# Patient Record
Sex: FEMALE | Race: WHITE | NOT HISPANIC OR LATINO | Employment: FULL TIME | URBAN - METROPOLITAN AREA
[De-identification: names, ages, dates, MRNs, and addresses within clinical notes are randomized per-mention and may not be internally consistent; named-entity substitution may affect disease eponyms.]

---

## 2023-07-12 ENCOUNTER — APPOINTMENT (OUTPATIENT)
Dept: LAB | Facility: LAB | Age: 28
End: 2023-07-12
Payer: COMMERCIAL

## 2023-08-19 LAB — CHORIOGONADOTROPIN (MIU/ML) IN SER/PLAS: 1493 IU/L

## 2023-11-18 ENCOUNTER — HOSPITAL ENCOUNTER (EMERGENCY)
Facility: HOSPITAL | Age: 28
Discharge: HOME | End: 2023-11-18
Attending: EMERGENCY MEDICINE
Payer: COMMERCIAL

## 2023-11-18 VITALS
RESPIRATION RATE: 16 BRPM | HEIGHT: 68 IN | SYSTOLIC BLOOD PRESSURE: 144 MMHG | WEIGHT: 127 LBS | TEMPERATURE: 98.5 F | HEART RATE: 64 BPM | DIASTOLIC BLOOD PRESSURE: 89 MMHG | BODY MASS INDEX: 19.25 KG/M2 | OXYGEN SATURATION: 98 %

## 2023-11-18 DIAGNOSIS — Z57.8 EMPLOYEE EXPOSURE TO BLOOD: Primary | ICD-10-CM

## 2023-11-18 PROCEDURE — 99281 EMR DPT VST MAYX REQ PHY/QHP: CPT

## 2023-11-18 PROCEDURE — 99283 EMERGENCY DEPT VISIT LOW MDM: CPT | Performed by: EMERGENCY MEDICINE

## 2023-11-18 PROCEDURE — 99285 EMERGENCY DEPT VISIT HI MDM: CPT | Performed by: EMERGENCY MEDICINE

## 2023-11-18 SDOH — HEALTH STABILITY - PHYSICAL HEALTH: OCCUPATIONAL EXPOSURE TO OTHER RISK FACTORS: Z57.8

## 2023-11-18 ASSESSMENT — COLUMBIA-SUICIDE SEVERITY RATING SCALE - C-SSRS
1. IN THE PAST MONTH, HAVE YOU WISHED YOU WERE DEAD OR WISHED YOU COULD GO TO SLEEP AND NOT WAKE UP?: NO
6. HAVE YOU EVER DONE ANYTHING, STARTED TO DO ANYTHING, OR PREPARED TO DO ANYTHING TO END YOUR LIFE?: NO
2. HAVE YOU ACTUALLY HAD ANY THOUGHTS OF KILLING YOURSELF?: NO

## 2023-11-18 NOTE — ED PROVIDER NOTES
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  Patient was obtaining an arterial blood gas when she was splashed in the face with blood.  Patient reports that she rinsed eyes at eyewash station for greater than 5 minutes.  Patient reports that she is unaware of any communicable diseases that the patient may have.    I looked up information for source patient, patient is currently admitted as a trauma gyn under alias no HIV test available under alias, I did look up the patient's true identity, did review laboratory testing noted that the patient had nonreactive HIV testing in 2019.    Records Reviewed:  Recent available ED and inpatient notes reviewed in EMR.    PMHx/PSHx:  Per HPI.   - has a past medical history of Encounter for surveillance of implantable subdermal contraceptive (05/14/2021), Other conditions influencing health status (05/18/2018), Other conditions influencing health status, Pain in unspecified shoulder (12/18/2017), and Personal history of other specified conditions (03/13/2018).  - has a past surgical history that includes Shoulder surgery (04/04/2017).    Medications:  Reviewed in EMR. See EMR for complete list of medications and doses.    Allergies:  Patient has no known allergies.    Social History:  - Tobacco:  has no history on file for tobacco use.   - Alcohol:  has no history on file for alcohol use.   - Illicit Drugs:  has no history on file for drug use.     ROS:  Per HPI.       ???????????????????????????????????????????????????????????????  Triage Vitals:  T 36.9 °C (98.5 °F)  HR 64  /89  RR 16  O2 98 % None (Room air)    Physical Exam  Vitals and nursing note reviewed.   Constitutional:       Appearance: Normal appearance.   HENT:      Head: Normocephalic and atraumatic.      Nose: Nose normal.      Mouth/Throat:      Pharynx:  Oropharynx is clear.   Eyes:      Extraocular Movements: Extraocular movements intact.      Pupils: Pupils are equal, round, and reactive to light.   Cardiovascular:      Rate and Rhythm: Normal rate and regular rhythm.      Pulses: Normal pulses.   Pulmonary:      Effort: Pulmonary effort is normal.      Breath sounds: Normal breath sounds.   Musculoskeletal:         General: No swelling or deformity. Normal range of motion.      Cervical back: Normal range of motion.      Right lower leg: No edema.      Left lower leg: No edema.   Skin:     General: Skin is warm and dry.      Capillary Refill: Capillary refill takes less than 2 seconds.   Neurological:      General: No focal deficit present.      Mental Status: She is alert and oriented to person, place, and time.   Psychiatric:         Mood and Affect: Mood normal.         Behavior: Behavior normal.       ???????????????????????????????????????????????????????????????    Assessment and Plan:  I had extensive discussion with the patient regarding the risks and benefits of postexposure prophylaxis for HIV.  Patient is declining initiation of postexposure prophylaxis at this time and is agreeable to following up with employee health on Monday morning.  Patient completed all appropriate paperwork, patient was provided with a laboratory requisition for source patient and was instructed to ensure that charge nurse sends ordered blood work including HIV antigen/antibody, hepatitis B surface antigen, hepatitis C antibody.  Patient is agreeable this plan agreeable discharge and is discharged from the emergency department stable condition.  All necessary post exposure paperwork was faxed to employee health and the patient is agreeable to employee health follow-up.  ED Course:   As noted above    Social Determinants Limiting Care:      Disposition:  Discharge with employee health follow-up.    Vince Otero MD       Procedures ? [employer] last updated 11/18/2023 6:03 PM         Vince Otero MD  Resident  11/18/23 4542

## 2023-11-18 NOTE — ED TRIAGE NOTES
Pt is employee was getting ABG when blood had squirted all over face and into eyes. Pt states rinsed eyes with water and CHG.

## 2023-11-18 NOTE — DISCHARGE INSTRUCTIONS
You have 72 hours to start postexposure prophylaxis.  You must follow-up with employee health first thing on Monday morning.    If you have any questions you can call this office during business hours at : [phone]    Additionally please ensure that blood work is sent for the patient that you were exposed to.  You have been provided with laboratory requisition form for this exposure.

## 2024-01-02 ENCOUNTER — HOSPITAL ENCOUNTER (EMERGENCY)
Facility: HOSPITAL | Age: 29
Discharge: HOME | End: 2024-01-02
Payer: COMMERCIAL

## 2024-01-02 VITALS
TEMPERATURE: 96.9 F | WEIGHT: 130 LBS | HEIGHT: 68 IN | HEART RATE: 71 BPM | OXYGEN SATURATION: 99 % | BODY MASS INDEX: 19.7 KG/M2 | RESPIRATION RATE: 16 BRPM

## 2024-01-02 DIAGNOSIS — Z77.21 EXPOSURE TO BODY FLUID: Primary | ICD-10-CM

## 2024-01-02 PROCEDURE — 99281 EMR DPT VST MAYX REQ PHY/QHP: CPT

## 2024-01-02 PROCEDURE — 99283 EMERGENCY DEPT VISIT LOW MDM: CPT | Performed by: PHYSICIAN ASSISTANT

## 2024-01-03 NOTE — ED TRIAGE NOTES
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 She was wearing her glasses and she had her mouth closed. Pt used alcohol and CHG to her face and cleaned the area thoroughly.

## 2024-01-03 NOTE — ED PROVIDER NOTES
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  She was wearing her glasses, no blood got in her eye.  Patient is \"99.8%\" certain that nothing got on her lips or in her mouth.  Patient states that her charge nurse told her that the safer thing is to come to the ED just in case.  The source patient in this instance is HIV positive, patient unsure of her viral load.                          Adan Coma Scale Score: 15                  Patient History   Past Medical History:   Diagnosis Date    Encounter for surveillance of implantable subdermal contraceptive 05/14/2021    Encounter for removal and reinsertion of Nexplanon    Other conditions influencing health status 05/18/2018    Cyst of neck    Other conditions influencing health status     Nulliparity    Pain in unspecified shoulder 12/18/2017    Pain in shoulder    Personal history of other specified conditions 03/13/2018    History of abnormal weight loss     Past Surgical History:   Procedure Laterality Date    SHOULDER SURGERY  04/04/2017    Shoulder Surgery     No family history on file.  Social History     Tobacco Use    Smoking status: Not on file    Smokeless tobacco: Not on file   Substance Use Topics    Alcohol use: Not on file    Drug use: Not on file       Physical Exam   ED Triage Vitals [01/02/24 2125]   Temp Heart Rate Resp BP   36.1 °C (96.9 °F) 71 16 --      SpO2 Temp Source Heart Rate Source Patient Position   99 % Temporal -- --      BP Location FiO2 (%)     -- --       Physical Exam  Vitals and nursing note reviewed.   Constitutional:       General: She is not in acute distress.     " Appearance: Normal appearance. She is not toxic-appearing.   HENT:      Head: Normocephalic and atraumatic.      Nose: Nose normal.   Eyes:      Extraocular Movements: Extraocular movements intact.   Cardiovascular:      Rate and Rhythm: Normal rate and regular rhythm.   Pulmonary:      Effort: Pulmonary effort is normal.   Abdominal:      Palpations: Abdomen is soft.   Musculoskeletal:         General: Normal range of motion.      Cervical back: Normal range of motion and neck supple.   Skin:     General: Skin is warm and dry.   Neurological:      General: No focal deficit present.      Mental Status: She is alert.   Psychiatric:         Mood and Affect: Mood normal.         Thought Content: Thought content normal.         ED Course & MDM        Medical Decision Making      MDM: Patient is a 28-year-old female who presents today after being splashed with blood while trying to unhook the J-tube on an IV on the patient.  Patient (source) is known HIV positive, unknown viral load.  This patient is very confident that there was no mucous membrane exposure.  I discussed with patient that the risk of spread even with the mucous membrane exposure is relatively low however if it only touched the skin, the risk is essentially nonexistent.  With this being the case,, patient would like to decline postexposure prophylaxis, we did order lab requisition for the source patient to have hepatitis B, C, HIV drawn for [email or web address] protocol.  Patient is aware to follow-up with [email or web address] tomorrow for further evaluation and management.        Procedure  Procedures     [place] PA-C  01/02/24 [id number]

## 2024-02-06 ENCOUNTER — OFFICE VISIT (OUTPATIENT)
Dept: OBSTETRICS AND GYNECOLOGY | Facility: CLINIC | Age: 29
End: 2024-02-06

## 2024-02-06 VITALS
WEIGHT: 134 LBS | HEIGHT: 68 IN | SYSTOLIC BLOOD PRESSURE: 120 MMHG | BODY MASS INDEX: 20.31 KG/M2 | DIASTOLIC BLOOD PRESSURE: 70 MMHG

## 2024-02-06 DIAGNOSIS — Z01.419 ENCOUNTER FOR WELL WOMAN EXAM WITH ROUTINE GYNECOLOGICAL EXAM: Primary | ICD-10-CM

## 2024-02-06 DIAGNOSIS — Z3A.01 LESS THAN 8 WEEKS GESTATION OF PREGNANCY (HHS-HCC): ICD-10-CM

## 2024-02-06 DIAGNOSIS — Z11.3 SCREEN FOR STD (SEXUALLY TRANSMITTED DISEASE): ICD-10-CM

## 2024-02-06 LAB — PREGNANCY TEST URINE, POC: POSITIVE

## 2024-02-06 PROCEDURE — 81025 URINE PREGNANCY TEST: CPT | Performed by: OBSTETRICS & GYNECOLOGY

## 2024-02-06 PROCEDURE — 1036F TOBACCO NON-USER: CPT | Performed by: OBSTETRICS & GYNECOLOGY

## 2024-02-06 PROCEDURE — 87800 DETECT AGNT MULT DNA DIREC: CPT

## 2024-02-06 PROCEDURE — 87591 N.GONORRHOEAE DNA AMP PROB: CPT

## 2024-02-06 PROCEDURE — 99395 PREV VISIT EST AGE 18-39: CPT | Performed by: OBSTETRICS & GYNECOLOGY

## 2024-02-06 PROCEDURE — 87491 CHLMYD TRACH DNA AMP PROBE: CPT

## 2024-02-06 RX ORDER — FOLIC ACID 0.8 MG
TABLET ORAL DAILY
COMMUNITY

## 2024-02-06 RX ORDER — ESCITALOPRAM OXALATE 10 MG/1
10 TABLET ORAL
COMMUNITY
Start: 2024-01-03 | End: 2024-04-02

## 2024-02-06 NOTE — PROGRESS NOTES
PAP 4-12-21 NEG, HPV NEG   STD SCREEN NEVER  GARDASIL NEVER    CHAPERONE: DECLINED    ASSESSMENT/PLAN    Encounter for well woman exam with routine gynecological  Normal well woman exam.  No pap done.   Last pap 2021 was normal and HPV negative.    Early pregnancy  Ultrasound confirms gestational sac size consistent with LMP dating of 5 weeks.   RTO for new OB visit 3 weeks.    Screen for STD (sexually transmitted disease)  C. Trachomatis / N. Gonorrhoeae, Amplified Detection     SUBJECTIVE    HPI    28-year-old G0 presents today for her annual exam.  Also notes she had a positive pregnancy test yesterday.   Sure LMP 1/2/2024 5 wks.   Last visit 9/2023 followup s/p EAB.  This is a planned pregnancy and pt happy about it.   Known h/o left breast fibroadenoma. Saw Nicolle Mcnulty at the breast center, last visit 6/2023. 6/2023 Ultrasound unchanged  We discussed the benign nature of the breast fibroadenomas.  Denies any other intervening medical or surgical issues.   Last year came off of Paxil. Was off for several months and doing okay. 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 Increased anxiety sx so one month ago started on lexapro 10mg. Doing very well on Lexapro.  No smoke, occ ETOH.       Review of Systems    Constitutional: no fever, no chills, no recent weight gain, no recent weight loss and no fatigue.   Eyes: no eye pain, no vision problems and no dryness of the eyes.   ENT: no hearing loss, no nosebleeds and no sinus congestion.   Cardiovascular: no chest pain, no palpitations and no orthopnea.   Respiratory: no shortness of breath, no cough and no wheezing.   Gastrointestinal: no abdominal pain, no constipation, no nausea, no diarrhea and no vomiting.   Genitourinary: no pelvic pain, no dysuria, no urinary incontinence, no vaginal dryness, no vaginal itching, no dyspareunia, no dysmenorrhea, no sexual problems, no change in urinary frequency, no vaginal discharge, no unexplained vaginal  "bleeding and no lesion/sore.   Musculoskeletal: no back pain, no joint swelling and no leg edema.   Integumentary: no rashes, no skin lesions, no breast pain, no nipple discharge and no breast lump.   Neurological: no headache, no numbness and no dizziness.   Psychiatric: no sleep disturbances, no anxiety and no depression.   Endocrine: no hot flashes, no loss of hair and no hirsutism.   Hematologic/Lymphatic: no swollen glands, no tendency for easy bleeding and no tendency for easy bruising.      OBJECTIVE    /70   Ht 1.734 m (5' 8.25\")   Wt 60.8 kg (134 lb)   LMP 01/02/2024   BMI 20.23 kg/m²      Physical Exam     Constitutional: Alert and in no acute distress. Well developed, well nourished   Head and Face: Head and face: normal   Eyes: Normal external exam - nonicteric sclera, extraocular movements intact (EOMI) and no ptosis.   Ears, Nose, Mouth, and Throat: External inspection of ears and nose: normal   Neck: no neck asymmetry. Supple and thyroid not enlarged and there were no palpable thyroid nodules   Cardiovascular: Heart rate and rhythm were normal, normal S1 and S2, no gallops, and no murmurs   Pulmonary: No respiratory distress and clear bilateral breath sounds   Chest: Breasts: normal appearance, no nipple discharge and no skin changes and palpation of breasts and axillae: no palpable mass and no axillary lymphadenopathy Left breast lump 1 x 1cm, 4 cm from nipple. Right breast 1x.8cm adjacent to areola.    Abdomen: soft nontender; no abdominal mass palpated, no organomegaly and no hernias   Genitourinary: external genitalia: normal, no inguinal lymphadenopathy, Bartholin's urethral and [place]'s glands: normal, urethra: normal, bladder: normal on palpation and perianal area: normal   Vagina: normal.   Cervix: Normal appearing without lesions.  Uterus: Normal, mobile, nontender.  Right Adnexa/parametria: Normal.   Left Adnexa/parametria: Normal.   Skin: normal skin color and pigmentation, normal " skin turgor, and no rash.   Psychiatric: alert and oriented x 3., affect normal to patient baseline and mood: appropriate     Informal IO transvaginal ultrasound shows intrauterine gestational sac .31 cm 4wk5d.         Jessica Butler MD

## 2024-02-07 LAB
C TRACH RRNA SPEC QL NAA+PROBE: NEGATIVE
N GONORRHOEA DNA SPEC QL PROBE+SIG AMP: NEGATIVE

## 2024-02-29 ENCOUNTER — OFFICE VISIT (OUTPATIENT)
Dept: OBSTETRICS AND GYNECOLOGY | Facility: CLINIC | Age: 29
End: 2024-02-29
Payer: COMMERCIAL

## 2024-02-29 ENCOUNTER — LAB (OUTPATIENT)
Dept: LAB | Facility: LAB | Age: 29
End: 2024-02-29
Payer: COMMERCIAL

## 2024-02-29 VITALS
SYSTOLIC BLOOD PRESSURE: 120 MMHG | DIASTOLIC BLOOD PRESSURE: 70 MMHG | BODY MASS INDEX: 20.31 KG/M2 | HEIGHT: 68 IN | WEIGHT: 134 LBS

## 2024-02-29 DIAGNOSIS — Z32.00 VISIT FOR CONFIRMATION OF PREGNANCY TEST RESULT WITH PHYSICAL EXAM: ICD-10-CM

## 2024-02-29 DIAGNOSIS — O21.9 NAUSEA AND VOMITING OF PREGNANCY, ANTEPARTUM (HHS-HCC): ICD-10-CM

## 2024-02-29 DIAGNOSIS — Z32.00 VISIT FOR CONFIRMATION OF PREGNANCY TEST RESULT WITH PHYSICAL EXAM: Primary | ICD-10-CM

## 2024-02-29 LAB
ABO GROUP (TYPE) IN BLOOD: NORMAL
ANTIBODY SCREEN: NORMAL
CRL: 16.4 MM
ERYTHROCYTE [DISTWIDTH] IN BLOOD BY AUTOMATED COUNT: 12.1 % (ref 11.5–14.5)
HBV SURFACE AG SERPL QL IA: NONREACTIVE
HCT VFR BLD AUTO: 39 % (ref 36–46)
HCV AB SER QL: NONREACTIVE
HGB BLD-MCNC: 12.7 G/DL (ref 12–16)
HIV 1+2 AB+HIV1 P24 AG SERPL QL IA: NONREACTIVE
MCH RBC QN AUTO: 31.1 PG (ref 26–34)
MCHC RBC AUTO-ENTMCNC: 32.6 G/DL (ref 32–36)
MCV RBC AUTO: 96 FL (ref 80–100)
NRBC BLD-RTO: 0 /100 WBCS (ref 0–0)
PLATELET # BLD AUTO: 260 X10*3/UL (ref 150–450)
RBC # BLD AUTO: 4.08 X10*6/UL (ref 4–5.2)
REFLEX ADDED, ANEMIA PANEL: NORMAL
RH FACTOR (ANTIGEN D): NORMAL
RUBV IGG SERPL IA-ACNC: 2.1 IA
RUBV IGG SERPL QL IA: POSITIVE
TREPONEMA PALLIDUM IGG+IGM AB [PRESENCE] IN SERUM OR PLASMA BY IMMUNOASSAY: NONREACTIVE
WBC # BLD AUTO: 6.3 X10*3/UL (ref 4.4–11.3)

## 2024-02-29 PROCEDURE — 85027 COMPLETE CBC AUTOMATED: CPT

## 2024-02-29 PROCEDURE — 87389 HIV-1 AG W/HIV-1&-2 AB AG IA: CPT

## 2024-02-29 PROCEDURE — 86317 IMMUNOASSAY INFECTIOUS AGENT: CPT

## 2024-02-29 PROCEDURE — 86850 RBC ANTIBODY SCREEN: CPT

## 2024-02-29 PROCEDURE — 86803 HEPATITIS C AB TEST: CPT

## 2024-02-29 PROCEDURE — 36415 COLL VENOUS BLD VENIPUNCTURE: CPT

## 2024-02-29 PROCEDURE — 87340 HEPATITIS B SURFACE AG IA: CPT

## 2024-02-29 PROCEDURE — 86901 BLOOD TYPING SEROLOGIC RH(D): CPT

## 2024-02-29 PROCEDURE — 86900 BLOOD TYPING SEROLOGIC ABO: CPT

## 2024-02-29 PROCEDURE — 1036F TOBACCO NON-USER: CPT | Performed by: OBSTETRICS & GYNECOLOGY

## 2024-02-29 PROCEDURE — 76817 TRANSVAGINAL US OBSTETRIC: CPT | Performed by: OBSTETRICS & GYNECOLOGY

## 2024-02-29 PROCEDURE — 99213 OFFICE O/P EST LOW 20 MIN: CPT | Performed by: OBSTETRICS & GYNECOLOGY

## 2024-02-29 PROCEDURE — 86780 TREPONEMA PALLIDUM: CPT

## 2024-02-29 RX ORDER — DOXYLAMINE SUCCINATE AND PYRIDOXINE HYDROCHLORIDE, DELAYED RELEASE TABLETS 10 MG/10 MG 10; 10 MG/1; MG/1
1 TABLET, DELAYED RELEASE ORAL 2 TIMES DAILY
Qty: 60 TABLET | Refills: 1 | Status: SHIPPED | OUTPATIENT
Start: 2024-02-29

## 2024-02-29 NOTE — PATIENT INSTRUCTIONS
You were seen in the office today for confirmation of pregnancy. Your baby measured 8 0/7 wks  on ultrasound today which is consistent with your last menstrual period dating.   Continue routine OB precautions at home  Continue taking prenatal vitamins. If you have not started prenatal vitamins you can start them now. Chewable and gummy prenatal vitamins are fine if it is difficult for you to swallow pills. Prenatal vitamins should have at least 800 mcg of Folic Acid to reduce the risk of neural tube/spinal defects in the baby  Routine prenatal lab work was ordered for you today and needs to be done prior to your next visit. These can be done at any  outpatient laboratory without an appointment, and do not require fasting.  Optional lab work to screen for genetic disorders can also be ordered. If these were discussed and/or ordered today the results will come separately from the rest of your routine labs, generally within 7-9 business days, and we will call you with these results. If you are still considering these labs we are happy to provide more information for review at home, and can answer any additional questions/order at the next office visit.  Avoid sick contacts and consider getting your Flu (available in office during flu season) and COVID vaccines to protect against infection in pregnancy  Make an appointment for a New OB visit in the office in the next 3-4 weeks  If you are having any concerns prior to your next visit please call the office to speak to the physician on call. This includes after hours, weekends, and holidays, when the answering service will be able to connect you with the physician on call. [phone] ([place] Office) or [phone] [place] Office.

## 2024-02-29 NOTE — PROGRESS NOTES
ASSESSMENT/PLAN    Visit for confirmation of pregnancy test result with physical exam  Today's ultrasound shows viable IUP 8 0/7 weeks which is consistent with your LMP dating.    Order OB screening labs.   - CBC Anemia Panel With Reflex,Pregnancy; Future  - Hepatitis B Surface Antigen; Future  - Hepatitis C Antibody; Future  - HIV 1/2 Antigen/Antibody Screen with Reflex to Confirmation; Future  - Rubella Antibody, IgG; Future  - Syphilis Screen with Reflex; Future  - Type And Screen; Future    - US OB transvaginal    Nausea and vomiting of pregnancy, antepartum  Diclegis rx sent to your pharmacy.  - doxylamine-pyridoxine, vit B6, 10-10 mg tablet,delayed release (DR/EC); Take 1 tablet by mouth 2 times a day.  Dispense: 60 tablet; Refill: 1     Pt given information about genetic screening both fetal and hereditary. Plans to do at next visit.     SUBJECTIVE    HPI  29 yo  sure LMP 2024 8 0/7 weeks presents for pregnancy confirmation.  Last visit 2024 for RA.  Had just had positive pregnancy test before that visit.   + nausea, lighheadedness.   Works nights as ICU nurse.  Anxiety on Lexapro.   accompanying her at today's visit.     Review of Systems    Constitutional: no fever, no chills, no recent weight gain, no recent weight loss and no fatigue.   Eyes: no eye pain, no vision problems and no dryness of the eyes.   ENT: no hearing loss, no nosebleeds and no sinus congestion.   Cardiovascular: no chest pain, no palpitations and no orthopnea.   Respiratory: no shortness of breath, no cough and no wheezing.   Gastrointestinal: no abdominal pain, no constipation, + nausea, no diarrhea and no vomiting.   Genitourinary: no pelvic pain, no dysuria, no urinary incontinence, no vaginal dryness, no vaginal itching, no dyspareunia, no dysmenorrhea, no sexual problems, no change in urinary frequency, no vaginal discharge, no unexplained vaginal bleeding and no lesion/sore.   Musculoskeletal: no back  "pain, no joint swelling and no leg edema.   Integumentary: no rashes, no skin lesions, no breast pain, no nipple discharge and no breast lump.   Neurological: no headache, no numbness and no dizziness.   Psychiatric: no sleep disturbances, no anxiety and no depression.   Endocrine: no hot flashes, no loss of hair and no hirsutism.   Hematologic/Lymphatic: no swollen glands, no tendency for easy bleeding and no tendency for easy bruising.      OBJECTIVE    /70   Ht 1.727 m (5' 8\")   Wt 60.8 kg (134 lb)   LMP 12/30/2023 (Approximate)   BMI 20.37 kg/m²     Transvaginal ultrasound shows single viable IUP CRL 16.4 mm 8 0/7 wks, + FCA.    Physical Exam     Constitutional: Alert and in no acute distress. Well developed, well nourished   Abdomen: soft nontender; no abdominal mass palpated, no organomegaly and no hernias   Genitourinary: external genitalia: normal.  Psychiatric: alert and oriented x 3., affect normal to patient baseline and mood: appropriate       Jessica Butler MD  "

## 2024-03-08 ENCOUNTER — TELEPHONE (OUTPATIENT)
Dept: OBSTETRICS AND GYNECOLOGY | Facility: CLINIC | Age: 29
End: 2024-03-08
Payer: COMMERCIAL

## 2024-03-13 ENCOUNTER — DOCUMENTATION (OUTPATIENT)
Dept: OBSTETRICS AND GYNECOLOGY | Facility: CLINIC | Age: 29
End: 2024-03-13
Payer: COMMERCIAL

## 2024-03-21 ENCOUNTER — APPOINTMENT (OUTPATIENT)
Dept: LAB | Facility: LAB | Age: 29
End: 2024-03-21
Payer: COMMERCIAL

## 2024-03-21 ENCOUNTER — INITIAL PRENATAL (OUTPATIENT)
Dept: OBSTETRICS AND GYNECOLOGY | Facility: CLINIC | Age: 29
End: 2024-03-21
Payer: COMMERCIAL

## 2024-03-21 VITALS — BODY MASS INDEX: 20.68 KG/M2 | SYSTOLIC BLOOD PRESSURE: 120 MMHG | WEIGHT: 136 LBS | DIASTOLIC BLOOD PRESSURE: 70 MMHG

## 2024-03-21 DIAGNOSIS — Z34.91 FIRST TRIMESTER PREGNANCY (HHS-HCC): ICD-10-CM

## 2024-03-21 DIAGNOSIS — Z34.91 INITIAL OBSTETRIC VISIT, FIRST TRIMESTER (HHS-HCC): Primary | ICD-10-CM

## 2024-03-21 DIAGNOSIS — Z34.81 SUPERVISION OF NORMAL INTRAUTERINE PREGNANCY IN MULTIGRAVIDA, FIRST TRIMESTER (HHS-HCC): ICD-10-CM

## 2024-03-21 DIAGNOSIS — Z12.4 ROUTINE CERVICAL SMEAR: ICD-10-CM

## 2024-03-21 DIAGNOSIS — Z13.79 GENETIC SCREENING: ICD-10-CM

## 2024-03-21 PROCEDURE — 36415 COLL VENOUS BLD VENIPUNCTURE: CPT

## 2024-03-21 PROCEDURE — 88175 CYTOPATH C/V AUTO FLUID REDO: CPT

## 2024-03-21 PROCEDURE — 0500F INITIAL PRENATAL CARE VISIT: CPT | Performed by: OBSTETRICS & GYNECOLOGY

## 2024-03-21 PROCEDURE — 87624 HPV HI-RISK TYP POOLED RSLT: CPT

## 2024-03-21 PROCEDURE — 87086 URINE CULTURE/COLONY COUNT: CPT

## 2024-03-21 PROCEDURE — 88141 CYTOPATH C/V INTERPRET: CPT | Performed by: PATHOLOGY

## 2024-03-21 RX ORDER — BISMUTH SUBSALICYLATE 262 MG
1 TABLET,CHEWABLE ORAL DAILY
COMMUNITY
End: 2024-04-18 | Stop reason: WASHOUT

## 2024-03-21 NOTE — PROGRESS NOTES
Subjective   Patient ID [id number]   Kate Hammer is a 28 y.o.  at 11w5d with a working estimated date of delivery of 10/5/2024, by Last Menstrual Period who presents for an initial prenatal visit.  accompanying her today.   Still with nausea. Occasional gagging, no vomiting. Takoing Unisom and vit B 6.   Anxiety controlled on Lexapro  Works as RN [employer].      OB History    Para Term  AB Living   2 0 0 0 1 0   SAB IAB Ectopic Multiple Live Births   0 1 0 0 0      # Outcome Date GA Lbr Jose/2nd Weight Sex Delivery Anes PTL Lv   2 Current            1 IAB 2023 5w0d                 Objective   Physical Exam  Weight: 61.7 kg (136 lb)  Expected Total Weight Gain: 11.5 kg (25 lb)-16 kg (35 lb)   Pregravid BMI: 20.38  BP: 120/70    IO transvaginal ultrasound shows single viable IUP 11  wks consistent with LMP dating.    OBGyn Exam  See prenatal     Prenatal Labs  Normal       Assessment/Plan     11  wks new OB visit.   Prenatal Labs ordered  Pap, urine culture done today.  First trimester screening and second trimester screening discussed. Patient decided to proceed with Prequel and expanded hereditary   Follow up in 4 weeks for return OB visit.

## 2024-03-22 LAB — BACTERIA UR CULT: NO GROWTH

## 2024-03-22 NOTE — PATIENT INSTRUCTIONS
Welcome to prenatal care with [facility]!  You were seen in the office today for your initiation to prenatal care and had normal findings on exam  Continue routine OB precautions at home  Your labs were reviewed today and were normal. Routine pelvic cultures, urine culture, and pap smear (if you were due) were done today and you will be notified of the results  If you have had genetic screening labs done, we usually receive the results from [employer] within 7-9 business days and we will call you with the results.   Continue taking prenatal vitamins   Avoid sick contacts and consider getting your Flu (available in office during flu season) and COVID vaccines to protect against infection in pregnancy    What to expect:  -    You will see each of our physicians at least once during your prenatal care. There are 5 physicians (Cliff Butler, Alan, Elias, Tanner, and Catarino) and our NP Marjan Lantigua. We rotate OB call to be able to provide the best care to our patients during this important time, and we want to make sure you have had a chance to meet each physician prior to coming in for labor.   -    We will see you in the office every 4 weeks in the first two trimesters, every 2 weeks between 30 and 36 weeks gestation, and weekly following 36 weeks. Anatomy ultrasounds are done at [facility] around 20 weeks, gestational diabetes and anemia screening is done through outpatient labs between 25 and 28 weeks gestation, and labor and delivery preparations (ultrasound to confirm presentation, consent forms, etc) are done at 36 weeks in the office.   -    Visits can seem quick, but provide us with quite a bit of important information. So it is important to try not to miss any visits if possible and make up any cancelled appointments as soon as possible. Make sure you come to each visit with a full bladder because urine testing for bacteria, glucose, and protein are done at each visit. And although the OB visits may seem quick,  we are happy to take the time to answer any questions or discuss any concerns you may have  -    We deliver at [facility]: [address] [place], [address]  -    Birth, lactation, and parenting classes, as well as scheduling for hospital tours can be done through [email or web address].       Make an appointment for routine care in the office in the next 4 weeks  If you are having any bleeding, pain, severe nausea and/or vomiting, or any other concerns prior to your next visit, please call the office to speak to the physician on call. This includes after hours, weekends, and holidays, when the answering service will be able to connect you with the physician on call. [phone] ([place] Office) or [phone] ([place] Office).    Congratulations, we are excited to partner with you in the care of your pregnancy!

## 2024-03-28 LAB — SCAN RESULT: NORMAL

## 2024-04-16 LAB — SCAN RESULT: NORMAL

## 2024-04-17 NOTE — PROGRESS NOTES
Subjective   Patient ID [id number]   Kate Hammer is a 29 y.o.  at 15w5d, Taking B6 and Unisom. Vomits once or twice a week. Otherwise nausea all the time. Switches days and nights and that makes it worse. Having H/As, takes Tylenol. Cut off caffeine.    Pregnancy complicated by:  N/V  Anxiety on lexapro    Objective   Physical Exam  Weight: 64.4 kg (142 lb)  BP: 110/78  Fetal Heart Rate: 150 Fundal Height (cm): 150 cm    Lab Results   Component Value Date    HGB 12.7 2024    HCT 39.0 2024       Assessment/Plan   Anatomy ultrasound  Discussed Tylenol, caffeine for H/As.  Will try zofran 4mg as needed. Try Pepcid first.   Follow up in 4 weeks for a routine prenatal visit.

## 2024-04-18 ENCOUNTER — ROUTINE PRENATAL (OUTPATIENT)
Dept: OBSTETRICS AND GYNECOLOGY | Facility: CLINIC | Age: 29
End: 2024-04-18
Payer: COMMERCIAL

## 2024-04-18 VITALS — SYSTOLIC BLOOD PRESSURE: 110 MMHG | BODY MASS INDEX: 21.59 KG/M2 | DIASTOLIC BLOOD PRESSURE: 78 MMHG | WEIGHT: 142 LBS

## 2024-04-18 DIAGNOSIS — Z3A.15 15 WEEKS GESTATION OF PREGNANCY (HHS-HCC): ICD-10-CM

## 2024-04-18 DIAGNOSIS — O21.9 NAUSEA AND VOMITING IN PREGNANCY PRIOR TO 22 WEEKS GESTATION (HHS-HCC): Primary | ICD-10-CM

## 2024-04-18 DIAGNOSIS — Z36.89 SCREENING, ANTENATAL, FOR FETAL ANATOMIC SURVEY (HHS-HCC): ICD-10-CM

## 2024-04-18 LAB
POC BLOOD, URINE: NEGATIVE
POC GLUCOSE, URINE: NEGATIVE MG/DL
POC KETONES, URINE: NEGATIVE MG/DL
POC LEUKOCYTES, URINE: NEGATIVE
POC NITRITE,URINE: NEGATIVE
POC PROTEIN, URINE: NEGATIVE MG/DL

## 2024-04-18 PROCEDURE — 0501F PRENATAL FLOW SHEET: CPT | Performed by: OBSTETRICS & GYNECOLOGY

## 2024-04-18 RX ORDER — ONDANSETRON 4 MG/1
4 TABLET, ORALLY DISINTEGRATING ORAL EVERY 8 HOURS PRN
Qty: 20 TABLET | Refills: 0 | Status: SHIPPED | OUTPATIENT
Start: 2024-04-18 | End: 2024-04-25

## 2024-04-18 NOTE — PATIENT INSTRUCTIONS
You were seen in the office today for routine OB care   Continue routine OB precautions at home  Continue taking prenatal vitamins   Avoid sick contacts and consider getting your Flu (available in office during flu season) and COVID vaccines to protect against infection in pregnancy  You will be given an order for your anatomy ultrasound. Please schedule at [facility] between 19 and 22 weeks gestation [phone]  You will be given a bottle of Glucola and orders for your second trimester labs. Please complete these between 25 and 28 weeks gestation. You may complete these at any  outpatient lab, and do not need an appointment  Make an appointment for routine care in the office in the next 4 weeks  If you are having any concerns prior to your next visit please call the office to speak to the physician on call. This includes after hours, weekends, and holidays, when the answering service will be able to connect you with the physician on call. [phone] ([place] Office) or [phone] [place] Office.

## 2024-05-10 ENCOUNTER — ROUTINE PRENATAL (OUTPATIENT)
Dept: OBSTETRICS AND GYNECOLOGY | Facility: CLINIC | Age: 29
End: 2024-05-10
Payer: COMMERCIAL

## 2024-05-10 VITALS — SYSTOLIC BLOOD PRESSURE: 122 MMHG | BODY MASS INDEX: 22.66 KG/M2 | DIASTOLIC BLOOD PRESSURE: 80 MMHG | WEIGHT: 149 LBS

## 2024-05-10 DIAGNOSIS — Z34.90 ENCOUNTER FOR SUPERVISION OF NORMAL PREGNANCY, ANTEPARTUM, UNSPECIFIED GRAVIDITY (HHS-HCC): Primary | ICD-10-CM

## 2024-05-10 DIAGNOSIS — O21.9 NAUSEA AND VOMITING OF PREGNANCY, ANTEPARTUM (HHS-HCC): ICD-10-CM

## 2024-05-10 LAB
POC APPEARANCE, URINE: CLEAR
POC BLOOD, URINE: NEGATIVE
POC COLOR, URINE: YELLOW
POC GLUCOSE, URINE: NEGATIVE MG/DL
POC KETONES, URINE: ABNORMAL MG/DL
POC LEUKOCYTES, URINE: NEGATIVE
POC NITRITE,URINE: NEGATIVE
POC PROTEIN, URINE: NEGATIVE MG/DL

## 2024-05-10 PROCEDURE — 0501F PRENATAL FLOW SHEET: CPT | Performed by: OBSTETRICS & GYNECOLOGY

## 2024-05-10 NOTE — PROGRESS NOTES
Subjective   Kate Hammer is a 29 y.o.  at 18w6d, presents for a routine prenatal visit.   Continues to have to call off work due to nausea and vomiting. Some days are okay. Used up all of her call off time.   Taking zofran and Pepcid.    Objective     /80   Wt 67.6 kg (149 lb)   LMP 2023 (Approximate)   BMI 22.66 kg/m²     Lab Results   Component Value Date    HGB 12.7 2024    HCT 39.0 2024       Plan  18 6/7 weeks.  RTO one month  Refill Zofran  Check on intermittent FMLA paperwork.  Has anatomy ultrasound in approx 2 weeks.     Jessica Butler MD

## 2024-05-11 RX ORDER — ONDANSETRON 4 MG/1
4 TABLET, FILM COATED ORAL EVERY 8 HOURS PRN
Qty: 20 TABLET | Refills: 0 | Status: SHIPPED | OUTPATIENT
Start: 2024-05-11 | End: 2024-05-18

## 2024-05-15 ENCOUNTER — APPOINTMENT (OUTPATIENT)
Dept: OBSTETRICS AND GYNECOLOGY | Facility: CLINIC | Age: 29
End: 2024-05-15
Payer: COMMERCIAL

## 2024-05-20 ENCOUNTER — HOSPITAL ENCOUNTER (OUTPATIENT)
Dept: RADIOLOGY | Facility: CLINIC | Age: 29
Discharge: HOME | End: 2024-05-20
Payer: COMMERCIAL

## 2024-05-20 DIAGNOSIS — Z36.89 SCREENING, ANTENATAL, FOR FETAL ANATOMIC SURVEY (HHS-HCC): ICD-10-CM

## 2024-05-20 PROCEDURE — 76811 OB US DETAILED SNGL FETUS: CPT | Performed by: OBSTETRICS & GYNECOLOGY

## 2024-05-20 PROCEDURE — 76811 OB US DETAILED SNGL FETUS: CPT

## 2024-06-04 ENCOUNTER — TELEPHONE (OUTPATIENT)
Dept: OBSTETRICS AND GYNECOLOGY | Facility: CLINIC | Age: 29
End: 2024-06-04
Payer: COMMERCIAL

## 2024-06-10 ENCOUNTER — APPOINTMENT (OUTPATIENT)
Dept: OBSTETRICS AND GYNECOLOGY | Facility: CLINIC | Age: 29
End: 2024-06-10
Payer: COMMERCIAL

## 2024-06-19 ENCOUNTER — APPOINTMENT (OUTPATIENT)
Dept: OBSTETRICS AND GYNECOLOGY | Facility: CLINIC | Age: 29
End: 2024-06-19
Payer: COMMERCIAL

## 2024-06-19 ENCOUNTER — LAB (OUTPATIENT)
Dept: LAB | Facility: LAB | Age: 29
End: 2024-06-19
Payer: COMMERCIAL

## 2024-06-19 VITALS — DIASTOLIC BLOOD PRESSURE: 68 MMHG | BODY MASS INDEX: 24.78 KG/M2 | WEIGHT: 163 LBS | SYSTOLIC BLOOD PRESSURE: 102 MMHG

## 2024-06-19 DIAGNOSIS — O21.9 NAUSEA AND VOMITING DURING PREGNANCY (HHS-HCC): ICD-10-CM

## 2024-06-19 DIAGNOSIS — Z34.90 ENCOUNTER FOR SUPERVISION OF NORMAL PREGNANCY, ANTEPARTUM, UNSPECIFIED GRAVIDITY (HHS-HCC): Primary | ICD-10-CM

## 2024-06-19 DIAGNOSIS — Z34.90 ENCOUNTER FOR SUPERVISION OF NORMAL PREGNANCY, ANTEPARTUM, UNSPECIFIED GRAVIDITY (HHS-HCC): ICD-10-CM

## 2024-06-19 LAB
ERYTHROCYTE [DISTWIDTH] IN BLOOD BY AUTOMATED COUNT: 13.2 % (ref 11.5–14.5)
GLUCOSE 1H P 50 G GLC PO SERPL-MCNC: 121 MG/DL
HCT VFR BLD AUTO: 32.7 % (ref 36–46)
HGB BLD-MCNC: 11.4 G/DL (ref 12–16)
MCH RBC QN AUTO: 33.9 PG (ref 26–34)
MCHC RBC AUTO-ENTMCNC: 34.9 G/DL (ref 32–36)
MCV RBC AUTO: 97 FL (ref 80–100)
NRBC BLD-RTO: 0 /100 WBCS (ref 0–0)
PLATELET # BLD AUTO: 263 X10*3/UL (ref 150–450)
RBC # BLD AUTO: 3.36 X10*6/UL (ref 4–5.2)
TREPONEMA PALLIDUM IGG+IGM AB [PRESENCE] IN SERUM OR PLASMA BY IMMUNOASSAY: NONREACTIVE
WBC # BLD AUTO: 8.2 X10*3/UL (ref 4.4–11.3)

## 2024-06-19 PROCEDURE — 86780 TREPONEMA PALLIDUM: CPT

## 2024-06-19 PROCEDURE — 82947 ASSAY GLUCOSE BLOOD QUANT: CPT

## 2024-06-19 PROCEDURE — 85027 COMPLETE CBC AUTOMATED: CPT

## 2024-06-19 PROCEDURE — 36415 COLL VENOUS BLD VENIPUNCTURE: CPT

## 2024-06-19 PROCEDURE — 0501F PRENATAL FLOW SHEET: CPT | Performed by: OBSTETRICS & GYNECOLOGY

## 2024-06-19 RX ORDER — ONDANSETRON 4 MG/1
4 TABLET, FILM COATED ORAL EVERY 6 HOURS PRN
Qty: 30 TABLET | Refills: 1 | Status: SHIPPED | OUTPATIENT
Start: 2024-06-19

## 2024-06-19 NOTE — PROGRESS NOTES
SUBJECTIVE  Kate Hammer is a 29 y.o.  at 24w4d with an estimated date of delivery of 10/5/2024, by Last Menstrual Period who presents for a routine prenatal visit.    She denies loss of fluid, vaginal bleeding, regular contractions/cramping, and decreased fetal movement. Nausea is still present but overall improved.     OBJECTIVE  Vitals:    24 1346   BP: 102/68   Weight: 73.9 kg (163 lb)          ASSESSMENT & PLAN    Nausea and vomiting during pregnancy ([facility])  - Overall improved  - Zofran Rx refilled    Encounter for supervision of normal pregnancy, antepartum ([facility])  - Up to date on care  - OGTT, CBC, syphilis today    Follow up in 2-3 weeks for return OB visit.    Lupe Haley MD  Obstetrics & Gynecology  24

## 2024-06-20 LAB — REFLEX ADDED, ANEMIA PANEL: NORMAL

## 2024-06-21 ENCOUNTER — APPOINTMENT (OUTPATIENT)
Dept: OBSTETRICS AND GYNECOLOGY | Facility: CLINIC | Age: 29
End: 2024-06-21
Payer: COMMERCIAL

## 2024-06-21 NOTE — PROGRESS NOTES
Kate Hammer female   1995 29 y.o.   [id number]      Chief Complaint    Follow-up          HPI  Kate Hammer is a 29 y.o.  [facility] stable right breast mass. She first noted the mass in 2022. She denies any trauma to the breast, no nipple discharge or fevers. She has a history of left breast mass, likely a fibroadenoma noted on imaging dating back to 2017. She denies prior breast biopsy or procedure. She has a family history of breast cancer in a maternal great aunt and paternal great aunt.     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  She is currently pregnant, JORGE 10/5/2024, had lots of nausea early on and is having a boy. She plans to breastfeed.    BREAST IMAGIN2023 right breast ultrasound, BI-RADS Category 3, 8:00 2cm 1.1 x 0.7 x 1.3cm probable benign fibroadenoma.    REPRODUCTIVE HISTORY: menarche 16, , currently pregnant    FAMILY CANCER HISTORY:   Paternal Great Aunt: Breast cancer   Maternal Great Aunt: Breast cancer     REVIEW OF SYSTEMS    Constitutional:  Negative for appetite change, fatigue, fever and unexpected weight change.   HENT:  Negative for ear pain, hearing loss, nosebleeds, sore throat and trouble swallowing.    Eyes:  Negative for discharge, itching and visual disturbance.   Respiratory:  Negative for cough, chest tightness and shortness of breath.    Cardiovascular:  Negative for chest pain, palpitations and leg swelling.   Breast: as indicated in HPI  Gastrointestinal:  Negative for abdominal pain, constipation, diarrhea and nausea.   Endocrine: Negative for cold intolerance and heat intolerance.   Genitourinary:  Negative for dysuria, frequency, hematuria, pelvic pain and vaginal bleeding.   Musculoskeletal:  Negative for arthralgias, back pain, gait problem, joint swelling and myalgias.   Skin:  Negative for color change and rash.   Allergic/Immunologic: Negative for environmental allergies and food allergies.    Neurological:  Negative for dizziness, tremors, speech difficulty, weakness, numbness and headaches.   Hematological:  Does not bruise/bleed easily.   Psychiatric/Behavioral:  Negative for agitation, dysphoric mood and sleep disturbance. The patient is not nervous/anxious.         MEDICATIONS  Current Outpatient Medications   Medication Instructions    doxylamine-pyridoxine, vit B6, 10-10 mg tablet,delayed release (DR/EC) 1 tablet, oral, 2 times daily    escitalopram (LEXAPRO) 10 mg, oral, Daily RT    folic acid (Folvite) 800 mcg tablet oral, Daily    ondansetron (ZOFRAN) 4 mg, oral, Every 6 hours PRN        ALLERGIES  No Known Allergies     SOCIAL HISTORY    Family History   Problem Relation Name Age of Onset    Other (DEPRESSION AND ANXIETY) Mother      Hyperlipidemia Mother      Hypertension Mother      Other (DEPRESSION AND ANXIETY) Father      Hyperlipidemia Father      Hypertension Father      Brain cancer Maternal Grandmother           Social History     Tobacco Use    Smoking status: Never    Smokeless tobacco: Never   Substance Use Topics    Alcohol use: Not Currently        VITALS  Vitals:    06/25/24 0955   BP: 114/73   Pulse: 79   Temp: 36.8 °C (98.2 °F)   SpO2: 99%        PHYSICAL EXAM  Patient is alert and oriented x3, with appropriate mood. The gait is steady and hand grasps are equal. Sclera clear. The breasts are nearly symmetrical. The tissue is soft without palpable abnormalities, discrete nodules or masses. The skin and nipples appear normal with hyperpigmentation skin darkening There is no cervical, supraclavicular, or axillary lymphadenopathy palpable. Heart rate and rhythm normal, S1 and S2 appreciated. The lungs are clear bilaterally. Abdomen is soft & non-tender.    Physical Exam     IMAGING  BI US breast limited right 06/25/2024    Narrative  Interpreted By:  Jorden Herzog,  STUDY:  BI US BREAST LIMITED RIGHT;  6/25/2024 9:41 am    INDICATION:  Follow-up of a probably benign  mass    COMPARISON:  06/20/2023, 12/13/2022, 04/12/2022    FINDINGS:  Targeted ultrasound was performed of the right breast by a registered  sonographer with elastography.    Sonographic images were obtained of the right breast. At the 8  o'clock position 2 cm from nipple the previously described mass is  noted again. It is not significantly changed. It measures 1.1 x 0.7 x  1.0 cm. It is still soft on avascular.    Impression  Mass for which follow-up was recommended has been stable for over 2  years and is therefore considered benign. No further follow-up is  needed.    BI-RADS Category:  2 Benign.  Recommendation:  Annual Screening.  Recommended Date:  Age 40 or based on Risk-Assessment.  Laterality:  Bilateral.    7    Time was spent viewing digital images of the radiology testing with the patient. I explained the results in depth, along with suggested explanation for follow up recommendations based on the testing results.          ORDERS  N/A          ASSESSMENT/PLAN  1. Fibroadenoma of right breast             Follow up Return to PCP.    Thank you for coming to see me today at [facility]. Your clinical examination and imaging is normal. You no longer need to be seen by a surgical breast specialist. I encourage your to  your baby. Please return to see me if you have a new breast problem or issue. It has been a pleasure having you as a patient.     You can see your health information, review clinical summaries from office visits & test results online when you follow your health with MY  Chart, a personal health record. To sign up go to [email or web address]. If you need assistance with signing up or trouble getting into your account call [email or web address] Patient Line 24/7 at [phone].     My office phone number is [phone] if you need to get in touch with me or have additional questions or problems. Thank you for choosing [facility] and trusting me as your  healthcare provider. I am honored to be a provider on your health care team and I remain dedicated to helping you achieve your health goals.       [facility]

## 2024-06-25 ENCOUNTER — HOSPITAL ENCOUNTER (OUTPATIENT)
Dept: RADIOLOGY | Facility: CLINIC | Age: 29
Discharge: HOME | End: 2024-06-25
Payer: COMMERCIAL

## 2024-06-25 ENCOUNTER — OFFICE VISIT (OUTPATIENT)
Dept: SURGICAL ONCOLOGY | Facility: CLINIC | Age: 29
End: 2024-06-25
Payer: COMMERCIAL

## 2024-06-25 VITALS
OXYGEN SATURATION: 99 % | SYSTOLIC BLOOD PRESSURE: 114 MMHG | HEART RATE: 79 BPM | WEIGHT: 165.2 LBS | DIASTOLIC BLOOD PRESSURE: 73 MMHG | BODY MASS INDEX: 25.12 KG/M2 | TEMPERATURE: 98.2 F

## 2024-06-25 DIAGNOSIS — D24.1 FIBROADENOMA OF RIGHT BREAST: Primary | ICD-10-CM

## 2024-06-25 DIAGNOSIS — N63.10 UNSPECIFIED LUMP IN THE RIGHT BREAST, UNSPECIFIED QUADRANT: ICD-10-CM

## 2024-06-25 PROCEDURE — 76642 ULTRASOUND BREAST LIMITED: CPT | Mod: RIGHT SIDE | Performed by: RADIOLOGY

## 2024-06-25 PROCEDURE — 76642 ULTRASOUND BREAST LIMITED: CPT | Mod: RT

## 2024-06-25 PROCEDURE — 99213 OFFICE O/P EST LOW 20 MIN: CPT | Performed by: NURSE PRACTITIONER

## 2024-06-25 PROCEDURE — 76982 USE 1ST TARGET LESION: CPT | Mod: RT

## 2024-06-25 ASSESSMENT — PAIN SCALES - GENERAL: PAINLEVEL: 0-NO PAIN

## 2024-06-27 ENCOUNTER — APPOINTMENT (OUTPATIENT)
Dept: OBSTETRICS AND GYNECOLOGY | Facility: CLINIC | Age: 29
End: 2024-06-27
Payer: COMMERCIAL

## 2024-07-17 ENCOUNTER — APPOINTMENT (OUTPATIENT)
Dept: OBSTETRICS AND GYNECOLOGY | Facility: CLINIC | Age: 29
End: 2024-07-17
Payer: COMMERCIAL

## 2024-07-17 VITALS — DIASTOLIC BLOOD PRESSURE: 70 MMHG | SYSTOLIC BLOOD PRESSURE: 116 MMHG | BODY MASS INDEX: 26.15 KG/M2 | WEIGHT: 172 LBS

## 2024-07-17 DIAGNOSIS — O21.9 NAUSEA AND VOMITING DURING PREGNANCY (HHS-HCC): ICD-10-CM

## 2024-07-17 DIAGNOSIS — Z34.80 SUPERVISION OF OTHER NORMAL PREGNANCY, ANTEPARTUM (HHS-HCC): ICD-10-CM

## 2024-07-17 DIAGNOSIS — Z23 NEED FOR TDAP VACCINATION: Primary | ICD-10-CM

## 2024-07-17 PROCEDURE — 90715 TDAP VACCINE 7 YRS/> IM: CPT | Performed by: OBSTETRICS & GYNECOLOGY

## 2024-07-17 PROCEDURE — 0501F PRENATAL FLOW SHEET: CPT | Performed by: OBSTETRICS & GYNECOLOGY

## 2024-07-17 PROCEDURE — 90471 IMMUNIZATION ADMIN: CPT | Performed by: OBSTETRICS & GYNECOLOGY

## 2024-07-17 NOTE — PROGRESS NOTES
SUBJECTIVE  Kate Hammer is a 29 y.o.  at 28w4d with an estimated date of delivery of 10/5/2024, by Last Menstrual Period who presents for a routine prenatal visit.    She denies loss of fluid, vaginal bleeding, regular contractions/cramping, and decreased fetal movement.     OBJECTIVE  Vitals:    24 1400   BP: 116/70   Weight: 78 kg (172 lb)          ASSESSMENT & PLAN    Encounter for supervision of normal pregnancy, antepartum ([facility])  - Up to date on care  - Tdap today    Follow up in 2 weeks for return OB visit.    Lupe Haley MD  Obstetrics & Gynecology  24

## 2024-07-31 ENCOUNTER — APPOINTMENT (OUTPATIENT)
Dept: OBSTETRICS AND GYNECOLOGY | Facility: CLINIC | Age: 29
End: 2024-07-31
Payer: COMMERCIAL

## 2024-07-31 VITALS — DIASTOLIC BLOOD PRESSURE: 72 MMHG | SYSTOLIC BLOOD PRESSURE: 104 MMHG | WEIGHT: 174 LBS | BODY MASS INDEX: 26.46 KG/M2

## 2024-07-31 DIAGNOSIS — O26.843 UTERINE SIZE-DATE DISCREPANCY IN THIRD TRIMESTER (HHS-HCC): Primary | ICD-10-CM

## 2024-07-31 DIAGNOSIS — O21.9 NAUSEA AND VOMITING DURING PREGNANCY (HHS-HCC): ICD-10-CM

## 2024-07-31 DIAGNOSIS — Z34.80 SUPERVISION OF OTHER NORMAL PREGNANCY, ANTEPARTUM (HHS-HCC): ICD-10-CM

## 2024-07-31 DIAGNOSIS — O22.43 HEMORRHOIDS DURING PREGNANCY IN THIRD TRIMESTER (HHS-HCC): ICD-10-CM

## 2024-07-31 PROCEDURE — 0501F PRENATAL FLOW SHEET: CPT | Performed by: OBSTETRICS & GYNECOLOGY

## 2024-07-31 NOTE — PROGRESS NOTES
SUBJECTIVE  Kate Hammer is a 29 y.o.  at 30w4d with an estimated date of delivery of 10/5/2024, by Last Menstrual Period who presents for a routine prenatal visit.    She denies loss of fluid, vaginal bleeding, regular contractions/cramping, and decreased fetal movement. Having some bleeding with bowel movements, no pain.    OBJECTIVE  Vitals:    24 1400   BP: 104/72   Weight: 78.9 kg (174 lb)          ASSESSMENT & PLAN    Encounter for supervision of normal pregnancy, antepartum ([facility])  - Up to date on care    Hemorrhoids during pregnancy in third trimester ([facility])  - Discussed titrating bowel movements to a toothpaste consistency    Uterine size-date discrepancy in third trimester ([facility])  - Growth ordered    Follow up in 2 weeks for return OB visit.    Lupe Haley MD  Obstetrics & Gynecology  24

## 2024-08-16 ENCOUNTER — APPOINTMENT (OUTPATIENT)
Dept: OBSTETRICS AND GYNECOLOGY | Facility: CLINIC | Age: 29
End: 2024-08-16
Payer: COMMERCIAL

## 2024-08-16 ENCOUNTER — HOSPITAL ENCOUNTER (OUTPATIENT)
Dept: RADIOLOGY | Facility: CLINIC | Age: 29
Discharge: HOME | End: 2024-08-16
Payer: COMMERCIAL

## 2024-08-16 VITALS — BODY MASS INDEX: 26.76 KG/M2 | SYSTOLIC BLOOD PRESSURE: 120 MMHG | WEIGHT: 176 LBS | DIASTOLIC BLOOD PRESSURE: 68 MMHG

## 2024-08-16 DIAGNOSIS — Z36.89 SCREENING, ANTENATAL, FOR FETAL ANATOMIC SURVEY (HHS-HCC): ICD-10-CM

## 2024-08-16 DIAGNOSIS — O35.EXX0 PYELECTASIS OF FETUS ON PRENATAL ULTRASOUND (HHS-HCC): ICD-10-CM

## 2024-08-16 DIAGNOSIS — Z34.80 SUPERVISION OF OTHER NORMAL PREGNANCY, ANTEPARTUM (HHS-HCC): Primary | ICD-10-CM

## 2024-08-16 DIAGNOSIS — O21.9 NAUSEA AND VOMITING DURING PREGNANCY (HHS-HCC): ICD-10-CM

## 2024-08-16 PROCEDURE — 76819 FETAL BIOPHYS PROFIL W/O NST: CPT

## 2024-08-16 PROCEDURE — 0501F PRENATAL FLOW SHEET: CPT | Performed by: OBSTETRICS & GYNECOLOGY

## 2024-08-16 PROCEDURE — 76816 OB US FOLLOW-UP PER FETUS: CPT

## 2024-08-16 RX ORDER — ONDANSETRON 4 MG/1
4 TABLET, FILM COATED ORAL EVERY 6 HOURS PRN
Qty: 30 TABLET | Refills: 2 | Status: SHIPPED | OUTPATIENT
Start: 2024-08-16

## 2024-08-16 NOTE — PROGRESS NOTES
OB Follow up visit    ASSESSMENT & PLAN    Kate Hammer is a 29 y.o.  at 32w6d presenting for routine prenatal care    Problem List Items Addressed This Visit       Encounter for supervision of normal pregnancy, antepartum ([facility]) - Primary    Overview     - waiting on pump to arrive  - discussed PP birth control, desires POP  - reviewed R/B of 39 week IOL         Nausea and vomiting during pregnancy ([facility])    Overview     - Overall improved  - Zofran Rx refilled         Relevant Medications    ondansetron (Zofran) 4 mg tablet    Pyelectasis of fetus on prenatal ultrasound ([facility])    Overview     Mild unilateral pyelectasis noted at 32 weeks. Scheduled for 4 week follow up            RTC in 2 weeks       SUBJECTIVE    HPI: Kate Hammer is a 29 y.o.  at 32w6d here for RPNV. Occasional contractions. Denies bleeding and LOF. Reports normal fetal movement.       OBJECTIVE    Visit Vitals  /68   Wt 79.8 kg (176 lb)   LMP 2023 (Approximate)   BMI 26.76 kg/m²   OB Status Pregnant   Smoking Status Never   BSA 1.96 m²        Maria De Jesus Mac MD ]

## 2024-08-30 ENCOUNTER — APPOINTMENT (OUTPATIENT)
Dept: OBSTETRICS AND GYNECOLOGY | Facility: CLINIC | Age: 29
End: 2024-08-30
Payer: COMMERCIAL

## 2024-08-30 VITALS — DIASTOLIC BLOOD PRESSURE: 72 MMHG | WEIGHT: 180 LBS | SYSTOLIC BLOOD PRESSURE: 108 MMHG | BODY MASS INDEX: 27.37 KG/M2

## 2024-08-30 DIAGNOSIS — O35.EXX0 PYELECTASIS OF FETUS ON PRENATAL ULTRASOUND (HHS-HCC): ICD-10-CM

## 2024-08-30 DIAGNOSIS — Z3A.34 34 WEEKS GESTATION OF PREGNANCY (HHS-HCC): Primary | ICD-10-CM

## 2024-08-30 DIAGNOSIS — O21.9 NAUSEA AND VOMITING DURING PREGNANCY (HHS-HCC): ICD-10-CM

## 2024-08-30 PROCEDURE — 0501F PRENATAL FLOW SHEET: CPT | Performed by: OBSTETRICS & GYNECOLOGY

## 2024-08-30 NOTE — PROGRESS NOTES
OB Follow up visit    ASSESSMENT & PLAN    Kate Hammer is a 29 y.o.  at 34w6d presenting for routine prenatal care    Problem List Items Addressed This Visit       Nausea and vomiting during pregnancy ([facility])    Overview     Tolerable, using pepcid for heartburn         Pyelectasis of fetus on prenatal ultrasound ([facility])    Overview     Mild unilateral pyelectasis noted at 32 weeks. Scheduled for 4 week follow up          34 weeks gestation of pregnancy ([facility]) - Primary    Overview     Desired provider in labor: [] CNM  [x] Physician  [x] Blood Products: [x] Yes, accepts [] No, needs counseling  [x] Initial BMI: 20.38   [x] Prenatal Labs: B+, Rubella immune, Hgb 12.7 (first trimester) -> 11.4 (second trimester)  [x] Cervical Cancer Screening up to date: ASCUS/HPV neg in 3/2024: repeat in 3 years  [x] Rh status: positive  [x] Genetic Screening:  Neg carrier testing and rr cfDNA  [x] NT US: (11-13 wks): Not done  [x] Baby ASA (if indicated): Not indicated  [x] Pregnancy dated by: LMP consistent with 8 week ultrasound at OSH    [x] Anatomy US: (19-20 wks): Normal  [x] Federal Sterilization consent signed (if indicated): NA  [x] 1hr GCT at 24-28wks: Nromal, 121  [x] Rhogam (if indicated): Not indicated  [x] Fetal Surveillance (if indicated): Not indicated  [] Tdap (27-32 wks, may be given up to 36 wks if initial window missed):   [] RSV (32-36 wks) (Sept. to end of ):   [] Flu Vaccine:    [x] Breastfeeding: plans to bf, has pump  [x] Postpartum Birth control method: Planning for POPs  [] GBS at 36 - 37 wks:  [x] 39 weeks discussion of IOL vs. Expectant management: Desires 39 week IOL  [x] Mode of delivery ( anticipated ): Plan                RTC in 1 week      SUBJECTIVE    HPI: Kate Hammer is a 29 y.o.  at 34w6d here for RPNV. Denies contractions, bleeding, or LOF. Reports normal fetal movement. Patient reports some discomfort over umbilicus. C/o bothersome heartburn, will start taking  pepcid BID.      OBJECTIVE    Visit Vitals  /72   Wt 81.6 kg (180 lb)   LMP 12/30/2023 (Approximate)   BMI 27.37 kg/m²   OB Status Pregnant   Smoking Status Never   BSA 1.98 m²        Maria De Jesus Mac MD

## 2024-09-06 ENCOUNTER — APPOINTMENT (OUTPATIENT)
Dept: OBSTETRICS AND GYNECOLOGY | Facility: CLINIC | Age: 29
End: 2024-09-06
Payer: COMMERCIAL

## 2024-09-06 VITALS — BODY MASS INDEX: 27.52 KG/M2 | SYSTOLIC BLOOD PRESSURE: 118 MMHG | DIASTOLIC BLOOD PRESSURE: 60 MMHG | WEIGHT: 181 LBS

## 2024-09-06 DIAGNOSIS — O35.EXX0 PYELECTASIS OF FETUS ON PRENATAL ULTRASOUND (HHS-HCC): ICD-10-CM

## 2024-09-06 DIAGNOSIS — O21.9 NAUSEA AND VOMITING DURING PREGNANCY (HHS-HCC): ICD-10-CM

## 2024-09-06 DIAGNOSIS — Z3A.36 36 WEEKS GESTATION OF PREGNANCY (HHS-HCC): Primary | ICD-10-CM

## 2024-09-06 DIAGNOSIS — Z34.80 SUPERVISION OF OTHER NORMAL PREGNANCY, ANTEPARTUM (HHS-HCC): ICD-10-CM

## 2024-09-06 PROCEDURE — 87081 CULTURE SCREEN ONLY: CPT

## 2024-09-07 PROBLEM — Z3A.36 36 WEEKS GESTATION OF PREGNANCY (HHS-HCC): Status: ACTIVE | Noted: 2024-08-30

## 2024-09-07 NOTE — PROGRESS NOTES
OB Follow up visit    ASSESSMENT & PLAN    Kate Hammer is a 29 y.o.  at 36w0d presenting for routine prenatal care    Problem List Items Addressed This Visit       Encounter for supervision of normal pregnancy, antepartum ([facility])    Relevant Orders    Group B Streptococcus (GBS) Prenatal Screen, Culture    Nausea and vomiting during pregnancy ([facility])    Overview     Tolerable, using pepcid for heartburn         Pyelectasis of fetus on prenatal ultrasound ([facility])    Overview     Mild unilateral pyelectasis noted at 32 weeks. Scheduled for 4 week follow up          36 weeks gestation of pregnancy ([facility]) - Primary    Overview     Desired provider in labor: [] CNM  [x] Physician  [x] Blood Products: [x] Yes, accepts [] No, needs counseling  [x] Initial BMI: 20.38   [x] Prenatal Labs: B+, Rubella immune, Hgb 12.7 (first trimester) -> 11.4 (second trimester)  [x] Cervical Cancer Screening up to date: ASCUS/HPV neg in 3/2024: repeat in 3 years  [x] Rh status: positive  [x] Genetic Screening:  Neg carrier testing and rr cfDNA  [x] NT US: (11-13 wks): Not done  [x] Baby ASA (if indicated): Not indicated  [x] Pregnancy dated by: LMP consistent with 8 week ultrasound at OSH    [x] Anatomy US: (19-20 wks): Normal  [x] Federal Sterilization consent signed (if indicated): NA  [x] 1hr GCT at 24-28wks: Nromal, 121  [x] Rhogam (if indicated): Not indicated  [x] Fetal Surveillance (if indicated): Not indicated  [x] Tdap (27-32 wks, may be given up to 36 wks if initial window missed): given   [] RSV (32-36 wks) (Sept. to end of ):   [] Flu Vaccine:    [x] Breastfeeding: plans to bf, has pump  [x] Postpartum Birth control method: Planning for POPs  [] GBS at 36 - 37 wks: collected   [x] 39 weeks discussion of IOL vs. Expectant management: Desires 39 week IOL, will request at [facility].  [x] Mode of delivery ( anticipated ): Plan                Orders Placed This Encounter   Procedures    Group B Streptococcus  (GBS) Prenatal Screen, Culture     Order Specific Question:   Release result to [employer]     Answer:   Immediate [1]        RTC in 1 week      SUBJECTIVE    HPI: Kate Hammer is a 29 y.o.  at 36w0d here for RPNV. Denies contractions, bleeding, or LOF. Reports normal fetal movement.       OBJECTIVE    Visit Vitals  /60   Wt 82.1 kg (181 lb)   LMP 2023 (Approximate)   BMI 27.52 kg/m²   OB Status Pregnant   Smoking Status Never   BSA 1.98 m²        Maria De Jesus Mac MD

## 2024-09-09 ENCOUNTER — APPOINTMENT (OUTPATIENT)
Dept: OBSTETRICS AND GYNECOLOGY | Facility: CLINIC | Age: 29
End: 2024-09-09
Payer: COMMERCIAL

## 2024-09-10 LAB — GP B STREP GENITAL QL CULT: NORMAL

## 2024-09-12 ENCOUNTER — APPOINTMENT (OUTPATIENT)
Dept: OBSTETRICS AND GYNECOLOGY | Facility: CLINIC | Age: 29
End: 2024-09-12
Payer: COMMERCIAL

## 2024-09-12 VITALS — WEIGHT: 184 LBS | SYSTOLIC BLOOD PRESSURE: 122 MMHG | DIASTOLIC BLOOD PRESSURE: 70 MMHG | BODY MASS INDEX: 27.98 KG/M2

## 2024-09-12 DIAGNOSIS — Z34.80 SUPERVISION OF OTHER NORMAL PREGNANCY, ANTEPARTUM (HHS-HCC): ICD-10-CM

## 2024-09-12 DIAGNOSIS — Z3A.36 36 WEEKS GESTATION OF PREGNANCY (HHS-HCC): ICD-10-CM

## 2024-09-12 DIAGNOSIS — O22.43 HEMORRHOIDS DURING PREGNANCY IN THIRD TRIMESTER (HHS-HCC): ICD-10-CM

## 2024-09-12 DIAGNOSIS — O26.843 UTERINE SIZE-DATE DISCREPANCY IN THIRD TRIMESTER (HHS-HCC): ICD-10-CM

## 2024-09-12 DIAGNOSIS — O21.9 NAUSEA AND VOMITING DURING PREGNANCY (HHS-HCC): ICD-10-CM

## 2024-09-12 PROCEDURE — 0501F PRENATAL FLOW SHEET: CPT | Performed by: OBSTETRICS & GYNECOLOGY

## 2024-09-12 NOTE — PROGRESS NOTES
OB Follow up visit    ASSESSMENT & PLAN    Kate Hammer is a 29 y.o.  at 36w5d presenting for routine prenatal care    Problem List Items Addressed This Visit       Encounter for supervision of normal pregnancy, antepartum ([facility])    Nausea and vomiting during pregnancy ([facility])    Overview     Tolerable, using pepcid for heartburn         Uterine size-date discrepancy in third trimester ([facility])    Hemorrhoids during pregnancy in third trimester ([facility])    36 weeks gestation of pregnancy ([facility])    Overview     Desired provider in labor: [] CNM  [x] Physician  [x] Blood Products: [x] Yes, accepts [] No, needs counseling  [x] Initial BMI: 20.38   [x] Prenatal Labs: B+, Rubella immune, Hgb 12.7 (first trimester) -> 11.4 (second trimester)  [x] Cervical Cancer Screening up to date: ASCUS/HPV neg in 3/2024: repeat in 3 years  [x] Rh status: positive  [x] Genetic Screening:  Neg carrier testing and rr cfDNA  [x] NT US: (11-13 wks): Not done  [x] Baby ASA (if indicated): Not indicated  [x] Pregnancy dated by: LMP consistent with 8 week ultrasound at OSH    [x] Anatomy US: (19-20 wks): Normal  [x] Federal Sterilization consent signed (if indicated): NA  [x] 1hr GCT at 24-28wks: Nromal, 121  [x] Rhogam (if indicated): Not indicated  [x] Fetal Surveillance (if indicated): Not indicated  [x] Tdap (27-32 wks, may be given up to 36 wks if initial window missed): given   [x] RSV (32-36 wks) (Sept. to end of ): NA, too late in pregnancy when RSV rolled out  [] Flu Vaccine: discussed and forgot to give at end of visit, give at [facility].    [x] Breastfeeding: plans to bf, has pump  [x] Postpartum Birth control method: Planning for POPs  [x] GBS at 36 - 37 wks: negative   [x] 39 weeks discussion of IOL vs. Expectant management: Desires 39 week IOL,  requested  [x] Mode of delivery ( anticipated ): Plan             RTC in 1 week      SUBJECTIVE    HPI: Kate Hammer is a 29 y.o.  at 36w5d here for  RPNV. Occasional contractions, Denies bleeding, or LOF. Reports normal fetal movement.      OBJECTIVE    Visit Vitals  /70   Wt 83.5 kg (184 lb)   LMP 12/30/2023 (Approximate)   BMI 27.98 kg/m²   OB Status Pregnant   Smoking Status Never   BSA 2 m²        Maria De Jesus Mac MD

## 2024-09-13 ENCOUNTER — HOSPITAL ENCOUNTER (OUTPATIENT)
Dept: RADIOLOGY | Facility: CLINIC | Age: 29
Discharge: HOME | End: 2024-09-13
Payer: COMMERCIAL

## 2024-09-13 DIAGNOSIS — O26.843 UTERINE SIZE-DATE DISCREPANCY IN THIRD TRIMESTER (HHS-HCC): ICD-10-CM

## 2024-09-13 PROCEDURE — 76819 FETAL BIOPHYS PROFIL W/O NST: CPT

## 2024-09-13 PROCEDURE — 76816 OB US FOLLOW-UP PER FETUS: CPT

## 2024-09-17 ENCOUNTER — APPOINTMENT (OUTPATIENT)
Dept: OBSTETRICS AND GYNECOLOGY | Facility: CLINIC | Age: 29
End: 2024-09-17
Payer: COMMERCIAL

## 2024-09-17 VITALS — DIASTOLIC BLOOD PRESSURE: 78 MMHG | WEIGHT: 184 LBS | SYSTOLIC BLOOD PRESSURE: 120 MMHG | BODY MASS INDEX: 27.98 KG/M2

## 2024-09-17 DIAGNOSIS — Z34.80 SUPERVISION OF OTHER NORMAL PREGNANCY, ANTEPARTUM (HHS-HCC): ICD-10-CM

## 2024-09-17 DIAGNOSIS — O22.43 HEMORRHOIDS DURING PREGNANCY IN THIRD TRIMESTER (HHS-HCC): ICD-10-CM

## 2024-09-17 DIAGNOSIS — Z3A.37 37 WEEKS GESTATION OF PREGNANCY (HHS-HCC): ICD-10-CM

## 2024-09-17 DIAGNOSIS — O21.9 NAUSEA AND VOMITING DURING PREGNANCY: ICD-10-CM

## 2024-09-17 PROBLEM — O26.843 UTERINE SIZE-DATE DISCREPANCY IN THIRD TRIMESTER (HHS-HCC): Status: RESOLVED | Noted: 2024-07-31 | Resolved: 2024-09-17

## 2024-09-17 PROCEDURE — 0501F PRENATAL FLOW SHEET: CPT | Performed by: OBSTETRICS & GYNECOLOGY

## 2024-09-17 NOTE — PROGRESS NOTES
SUBJECTIVE  Kate Hammer is a 29 y.o.  at 37w3d with an estimated date of delivery of 10/5/2024, by Last Menstrual Period who presents for a routine prenatal visit.    She denies loss of fluid, vaginal bleeding, regular contractions/cramping, and decreased fetal movement.     OBJECTIVE  Vitals:    24 1600   BP: 120/78   Weight: 83.5 kg (184 lb)          ASSESSMENT & PLAN    Pyelectasis of fetus on prenatal ultrasound ([facility])  - Normal on follow up scan    37 weeks gestation of pregnancy ([facility])  - Up to date on care  - Interested in moving IOL to 10/1, will request    Follow up in 1 week for return OB visit.    Lupe Haley MD  Obstetrics & Gynecology  24

## 2024-09-18 DIAGNOSIS — Z34.80 SUPERVISION OF OTHER NORMAL PREGNANCY, ANTEPARTUM (HHS-HCC): Primary | ICD-10-CM

## 2024-09-23 ENCOUNTER — APPOINTMENT (OUTPATIENT)
Dept: OBSTETRICS AND GYNECOLOGY | Facility: CLINIC | Age: 29
End: 2024-09-23
Payer: COMMERCIAL

## 2024-09-23 VITALS — SYSTOLIC BLOOD PRESSURE: 120 MMHG | DIASTOLIC BLOOD PRESSURE: 78 MMHG | BODY MASS INDEX: 28.28 KG/M2 | WEIGHT: 186 LBS

## 2024-09-23 DIAGNOSIS — O21.9 NAUSEA AND VOMITING DURING PREGNANCY: ICD-10-CM

## 2024-09-23 DIAGNOSIS — Z3A.38 38 WEEKS GESTATION OF PREGNANCY (HHS-HCC): ICD-10-CM

## 2024-09-23 DIAGNOSIS — O22.43 HEMORRHOIDS DURING PREGNANCY IN THIRD TRIMESTER (HHS-HCC): ICD-10-CM

## 2024-09-23 DIAGNOSIS — Z34.80 SUPERVISION OF OTHER NORMAL PREGNANCY, ANTEPARTUM (HHS-HCC): ICD-10-CM

## 2024-09-23 PROCEDURE — 0501F PRENATAL FLOW SHEET: CPT | Performed by: OBSTETRICS & GYNECOLOGY

## 2024-09-23 PROCEDURE — 90471 IMMUNIZATION ADMIN: CPT | Performed by: OBSTETRICS & GYNECOLOGY

## 2024-09-23 PROCEDURE — 90656 IIV3 VACC NO PRSV 0.5 ML IM: CPT | Performed by: OBSTETRICS & GYNECOLOGY

## 2024-09-23 NOTE — PROGRESS NOTES
Ob Follow-up  24     SUBJECTIVE      HPI: Kate Hammer is a 29 y.o.  at 38w2d here for RPNV.  She has rare contractions, no bleeding, or LOF. Reports normal fetal movement. Patient without complaints today       OBJECTIVE  Visit Vitals  /78   Wt 84.4 kg (186 lb)   LMP 2023 (Approximate)   BMI 28.28 kg/m²   OB Status Pregnant   Smoking Status Never   BSA 2.01 m²            ASSESSMENT & PLAN    Kate Hammer is a 29 y.o.  at 38w2d here for the following concerns we addressed today:    Problem List Items Addressed This Visit       Encounter for supervision of normal pregnancy, antepartum ([facility])    Relevant Orders    Flu vaccine, trivalent, preservative free, age 6 months and greater (Fluarix/Fluzone/Flulaval) (Completed)    Nausea and vomiting during pregnancy ([facility])    Overview     Tolerable, using pepcid for heartburn         Hemorrhoids during pregnancy in third trimester ([facility])    38 weeks gestation of pregnancy ([facility])    Overview     Desired provider in labor: [] CNM  [x] Physician  [x] Blood Products: [x] Yes, accepts [] No, needs counseling  [x] Initial BMI: 20.38   [x] Prenatal Labs: B+, Rubella immune, Hgb 12.7 (first trimester) -> 11.4 (second trimester)  [x] Cervical Cancer Screening up to date: ASCUS/HPV neg in 3/2024: repeat in 3 years  [x] Rh status: positive  [x] Genetic Screening:  Neg carrier testing and rr cfDNA  [x] NT US: (11-13 wks): Not done  [x] Baby ASA (if indicated): Not indicated  [x] Pregnancy dated by: LMP consistent with 8 week ultrasound at OSH    [x] Anatomy US: (19-20 wks): Normal  [x] Federal Sterilization consent signed (if indicated): NA  [x] 1hr GCT at 24-28wks: Nromal, 121  [x] Rhogam (if indicated): Not indicated  [x] Fetal Surveillance (if indicated): Not indicated  [x] Tdap (27-32 wks, may be given up to 36 wks if initial window missed): given   [x] RSV (32-36 wks) (Sept. to end of ): NA, too late in pregnancy when RSV rolled  out  [x] Flu Vaccine: given 24    [x] Breastfeeding: plans to bf, has pump  [x] Postpartum Birth control method: Planning for POPs  [x] GBS at 36 - 37 wks: negative   [x] 39 weeks discussion of IOL vs. Expectant management: Desires 39 week IOL, scheduled for   [x] Mode of delivery ( anticipated ): Plan                 Orders Placed This Encounter   Procedures    Flu vaccine, trivalent, preservative free, age 6 months and greater (Fluarix/Fluzone/Flulaval)       Follow up for IOL in 1 week or sooner PRRADHA Mac MD

## 2024-09-25 PROBLEM — Z3A.38 38 WEEKS GESTATION OF PREGNANCY (HHS-HCC): Status: ACTIVE | Noted: 2024-08-30

## 2024-09-30 ENCOUNTER — HOSPITAL ENCOUNTER (INPATIENT)
Facility: HOSPITAL | Age: 29
LOS: 3 days | Discharge: HOME | End: 2024-10-03
Attending: OBSTETRICS & GYNECOLOGY | Admitting: OBSTETRICS & GYNECOLOGY
Payer: COMMERCIAL

## 2024-09-30 ENCOUNTER — APPOINTMENT (OUTPATIENT)
Dept: OBSTETRICS AND GYNECOLOGY | Facility: HOSPITAL | Age: 29
End: 2024-09-30
Payer: COMMERCIAL

## 2024-09-30 DIAGNOSIS — Z34.80 SUPERVISION OF OTHER NORMAL PREGNANCY, ANTEPARTUM (HHS-HCC): ICD-10-CM

## 2024-09-30 DIAGNOSIS — Z30.011 ENCOUNTER FOR INITIAL PRESCRIPTION OF CONTRACEPTIVE PILLS: Primary | ICD-10-CM

## 2024-09-30 DIAGNOSIS — O09.299 HX OF MATERNAL LACERATION, 3RD DEGREE, CURRENTLY PREGNANT (HHS-HCC): ICD-10-CM

## 2024-09-30 PROBLEM — Z3A.39 39 WEEKS GESTATION OF PREGNANCY (HHS-HCC): Status: ACTIVE | Noted: 2024-09-30

## 2024-09-30 LAB
ABO GROUP (TYPE) IN BLOOD: NORMAL
ANTIBODY SCREEN: NORMAL
ERYTHROCYTE [DISTWIDTH] IN BLOOD BY AUTOMATED COUNT: 13.6 % (ref 11.5–14.5)
HCT VFR BLD AUTO: 33.8 % (ref 36–46)
HGB BLD-MCNC: 11.5 G/DL (ref 12–16)
MCH RBC QN AUTO: 30 PG (ref 26–34)
MCHC RBC AUTO-ENTMCNC: 34 G/DL (ref 32–36)
MCV RBC AUTO: 88 FL (ref 80–100)
NRBC BLD-RTO: 0 /100 WBCS (ref 0–0)
PLATELET # BLD AUTO: 267 X10*3/UL (ref 150–450)
RBC # BLD AUTO: 3.83 X10*6/UL (ref 4–5.2)
RH FACTOR (ANTIGEN D): NORMAL
TREPONEMA PALLIDUM IGG+IGM AB [PRESENCE] IN SERUM OR PLASMA BY IMMUNOASSAY: NONREACTIVE
WBC # BLD AUTO: 10.6 X10*3/UL (ref 4.4–11.3)

## 2024-09-30 PROCEDURE — 86901 BLOOD TYPING SEROLOGIC RH(D): CPT

## 2024-09-30 PROCEDURE — 2500000004 HC RX 250 GENERAL PHARMACY W/ HCPCS (ALT 636 FOR OP/ED)

## 2024-09-30 PROCEDURE — 36415 COLL VENOUS BLD VENIPUNCTURE: CPT

## 2024-09-30 PROCEDURE — 2500000001 HC RX 250 WO HCPCS SELF ADMINISTERED DRUGS (ALT 637 FOR MEDICARE OP)

## 2024-09-30 PROCEDURE — 59050 FETAL MONITOR W/REPORT: CPT

## 2024-09-30 PROCEDURE — 85027 COMPLETE CBC AUTOMATED: CPT

## 2024-09-30 PROCEDURE — 86780 TREPONEMA PALLIDUM: CPT

## 2024-09-30 PROCEDURE — 7210000002 HC LABOR PER HOUR

## 2024-09-30 PROCEDURE — 80053 COMPREHEN METABOLIC PANEL: CPT

## 2024-09-30 PROCEDURE — 1220000001 HC OB SEMI-PRIVATE ROOM DAILY

## 2024-09-30 RX ORDER — ONDANSETRON HYDROCHLORIDE 2 MG/ML
4 INJECTION, SOLUTION INTRAVENOUS EVERY 6 HOURS PRN
Status: DISCONTINUED | OUTPATIENT
Start: 2024-09-30 | End: 2024-10-01

## 2024-09-30 RX ORDER — MISOPROSTOL 200 UG/1
800 TABLET ORAL ONCE AS NEEDED
Status: DISCONTINUED | OUTPATIENT
Start: 2024-09-30 | End: 2024-10-01 | Stop reason: HOSPADM

## 2024-09-30 RX ORDER — OXYTOCIN 10 [USP'U]/ML
10 INJECTION, SOLUTION INTRAMUSCULAR; INTRAVENOUS ONCE AS NEEDED
Status: DISCONTINUED | OUTPATIENT
Start: 2024-09-30 | End: 2024-10-01 | Stop reason: HOSPADM

## 2024-09-30 RX ORDER — CARBOPROST TROMETHAMINE 250 UG/ML
250 INJECTION, SOLUTION INTRAMUSCULAR ONCE AS NEEDED
Status: DISCONTINUED | OUTPATIENT
Start: 2024-09-30 | End: 2024-10-01 | Stop reason: HOSPADM

## 2024-09-30 RX ORDER — ESCITALOPRAM OXALATE 10 MG/1
10 TABLET ORAL
Status: DISCONTINUED | OUTPATIENT
Start: 2024-10-01 | End: 2024-09-30

## 2024-09-30 RX ORDER — TRANEXAMIC ACID 100 MG/ML
1000 INJECTION, SOLUTION INTRAVENOUS ONCE AS NEEDED
Status: DISCONTINUED | OUTPATIENT
Start: 2024-09-30 | End: 2024-10-01 | Stop reason: HOSPADM

## 2024-09-30 RX ORDER — FAMOTIDINE 20 MG/1
20 TABLET, FILM COATED ORAL 2 TIMES DAILY
Status: DISCONTINUED | OUTPATIENT
Start: 2024-09-30 | End: 2024-10-01

## 2024-09-30 RX ORDER — OXYTOCIN/0.9 % SODIUM CHLORIDE 30/500 ML
2-30 PLASTIC BAG, INJECTION (ML) INTRAVENOUS CONTINUOUS
Status: DISCONTINUED | OUTPATIENT
Start: 2024-09-30 | End: 2024-10-01

## 2024-09-30 RX ORDER — METHYLERGONOVINE MALEATE 0.2 MG/ML
0.2 INJECTION INTRAVENOUS ONCE AS NEEDED
Status: DISCONTINUED | OUTPATIENT
Start: 2024-09-30 | End: 2024-10-01 | Stop reason: HOSPADM

## 2024-09-30 RX ORDER — LOPERAMIDE HYDROCHLORIDE 2 MG/1
4 CAPSULE ORAL EVERY 2 HOUR PRN
Status: DISCONTINUED | OUTPATIENT
Start: 2024-09-30 | End: 2024-10-01 | Stop reason: HOSPADM

## 2024-09-30 RX ORDER — FAMOTIDINE 20 MG/1
20 TABLET, FILM COATED ORAL 2 TIMES DAILY
COMMUNITY

## 2024-09-30 RX ORDER — METOCLOPRAMIDE HYDROCHLORIDE 5 MG/ML
10 INJECTION INTRAMUSCULAR; INTRAVENOUS EVERY 6 HOURS PRN
Status: DISCONTINUED | OUTPATIENT
Start: 2024-09-30 | End: 2024-10-01

## 2024-09-30 RX ORDER — ESCITALOPRAM OXALATE 10 MG/1
10 TABLET ORAL NIGHTLY
Status: DISCONTINUED | OUTPATIENT
Start: 2024-10-01 | End: 2024-10-01

## 2024-09-30 RX ORDER — NIFEDIPINE 10 MG/1
10 CAPSULE ORAL ONCE AS NEEDED
Status: DISCONTINUED | OUTPATIENT
Start: 2024-09-30 | End: 2024-10-01 | Stop reason: HOSPADM

## 2024-09-30 RX ORDER — METOCLOPRAMIDE 10 MG/1
10 TABLET ORAL EVERY 6 HOURS PRN
Status: DISCONTINUED | OUTPATIENT
Start: 2024-09-30 | End: 2024-10-01

## 2024-09-30 RX ORDER — TERBUTALINE SULFATE 1 MG/ML
0.25 INJECTION SUBCUTANEOUS ONCE AS NEEDED
Status: DISCONTINUED | OUTPATIENT
Start: 2024-09-30 | End: 2024-10-01 | Stop reason: HOSPADM

## 2024-09-30 RX ORDER — SODIUM CHLORIDE, SODIUM LACTATE, POTASSIUM CHLORIDE, CALCIUM CHLORIDE 600; 310; 30; 20 MG/100ML; MG/100ML; MG/100ML; MG/100ML
125 INJECTION, SOLUTION INTRAVENOUS CONTINUOUS
Status: DISCONTINUED | OUTPATIENT
Start: 2024-09-30 | End: 2024-10-01

## 2024-09-30 RX ORDER — OXYTOCIN/0.9 % SODIUM CHLORIDE 30/500 ML
60 PLASTIC BAG, INJECTION (ML) INTRAVENOUS ONCE AS NEEDED
Status: COMPLETED | OUTPATIENT
Start: 2024-09-30 | End: 2024-10-01

## 2024-09-30 RX ORDER — HYDRALAZINE HYDROCHLORIDE 20 MG/ML
5 INJECTION INTRAMUSCULAR; INTRAVENOUS ONCE AS NEEDED
Status: DISCONTINUED | OUTPATIENT
Start: 2024-09-30 | End: 2024-10-01 | Stop reason: HOSPADM

## 2024-09-30 RX ORDER — ONDANSETRON 4 MG/1
4 TABLET, FILM COATED ORAL EVERY 6 HOURS PRN
Status: DISCONTINUED | OUTPATIENT
Start: 2024-09-30 | End: 2024-10-01

## 2024-09-30 RX ORDER — LABETALOL HYDROCHLORIDE 5 MG/ML
20 INJECTION, SOLUTION INTRAVENOUS ONCE AS NEEDED
Status: DISCONTINUED | OUTPATIENT
Start: 2024-09-30 | End: 2024-10-01 | Stop reason: HOSPADM

## 2024-09-30 RX ORDER — LIDOCAINE HYDROCHLORIDE 10 MG/ML
30 INJECTION, SOLUTION INFILTRATION; PERINEURAL ONCE AS NEEDED
Status: DISCONTINUED | OUTPATIENT
Start: 2024-09-30 | End: 2024-10-01 | Stop reason: HOSPADM

## 2024-09-30 SDOH — SOCIAL STABILITY: SOCIAL INSECURITY: DO YOU FEEL ANYONE HAS EXPLOITED OR TAKEN ADVANTAGE OF YOU FINANCIALLY OR OF YOUR PERSONAL PROPERTY?: NO

## 2024-09-30 SDOH — SOCIAL STABILITY: SOCIAL INSECURITY: PHYSICAL ABUSE: DENIES

## 2024-09-30 SDOH — ECONOMIC STABILITY: HOUSING INSECURITY: DO YOU FEEL UNSAFE GOING BACK TO THE PLACE WHERE YOU ARE LIVING?: NO

## 2024-09-30 SDOH — SOCIAL STABILITY: SOCIAL INSECURITY: WITHIN THE LAST YEAR, HAVE YOU BEEN HUMILIATED OR EMOTIONALLY ABUSED IN OTHER WAYS BY YOUR PARTNER OR EX-PARTNER?: NO

## 2024-09-30 SDOH — SOCIAL STABILITY: SOCIAL INSECURITY: HAVE YOU HAD ANY THOUGHTS OF HARMING ANYONE ELSE?: NO

## 2024-09-30 SDOH — SOCIAL STABILITY: SOCIAL INSECURITY
WITHIN THE LAST YEAR, HAVE TO BEEN RAPED OR FORCED TO HAVE ANY KIND OF SEXUAL ACTIVITY BY YOUR PARTNER OR EX-PARTNER?: NO

## 2024-09-30 SDOH — SOCIAL STABILITY: SOCIAL INSECURITY: WITHIN THE LAST YEAR, HAVE YOU BEEN AFRAID OF YOUR PARTNER OR EX-PARTNER?: NO

## 2024-09-30 SDOH — SOCIAL STABILITY: SOCIAL INSECURITY
WITHIN THE LAST YEAR, HAVE YOU BEEN KICKED, HIT, SLAPPED, OR OTHERWISE PHYSICALLY HURT BY YOUR PARTNER OR EX-PARTNER?: NO

## 2024-09-30 SDOH — ECONOMIC STABILITY: FOOD INSECURITY: WITHIN THE PAST 12 MONTHS, YOU WORRIED THAT YOUR FOOD WOULD RUN OUT BEFORE YOU GOT MONEY TO BUY MORE.: NEVER TRUE

## 2024-09-30 SDOH — ECONOMIC STABILITY: FOOD INSECURITY: WITHIN THE PAST 12 MONTHS, THE FOOD YOU BOUGHT JUST DIDN'T LAST AND YOU DIDN'T HAVE MONEY TO GET MORE.: NEVER TRUE

## 2024-09-30 SDOH — SOCIAL STABILITY: SOCIAL INSECURITY: ARE THERE ANY APPARENT SIGNS OF INJURIES/BEHAVIORS THAT COULD BE RELATED TO ABUSE/NEGLECT?: NO

## 2024-09-30 SDOH — SOCIAL STABILITY: SOCIAL INSECURITY: ABUSE SCREEN: ADULT

## 2024-09-30 SDOH — HEALTH STABILITY: MENTAL HEALTH: SUICIDAL BEHAVIOR (LIFETIME): NO

## 2024-09-30 SDOH — HEALTH STABILITY: MENTAL HEALTH: WERE YOU ABLE TO COMPLETE ALL THE BEHAVIORAL HEALTH SCREENINGS?: YES

## 2024-09-30 SDOH — SOCIAL STABILITY: SOCIAL INSECURITY: HAVE YOU HAD THOUGHTS OF HARMING ANYONE ELSE?: NO

## 2024-09-30 SDOH — HEALTH STABILITY: MENTAL HEALTH: WISH TO BE DEAD (PAST 1 MONTH): NO

## 2024-09-30 SDOH — SOCIAL STABILITY: SOCIAL INSECURITY: VERBAL ABUSE: DENIES

## 2024-09-30 SDOH — SOCIAL STABILITY: SOCIAL INSECURITY: ARE YOU OR HAVE YOU BEEN THREATENED OR ABUSED PHYSICALLY, EMOTIONALLY, OR SEXUALLY BY ANYONE?: NO

## 2024-09-30 SDOH — HEALTH STABILITY: MENTAL HEALTH: NON-SPECIFIC ACTIVE SUICIDAL THOUGHTS (PAST 1 MONTH): NO

## 2024-09-30 SDOH — SOCIAL STABILITY: SOCIAL INSECURITY: HAS ANYONE EVER THREATENED TO HURT YOUR FAMILY OR YOUR PETS?: NO

## 2024-09-30 SDOH — SOCIAL STABILITY: SOCIAL INSECURITY: DOES ANYONE TRY TO KEEP YOU FROM HAVING/CONTACTING OTHER FRIENDS OR DOING THINGS OUTSIDE YOUR HOME?: NO

## 2024-09-30 SDOH — HEALTH STABILITY: MENTAL HEALTH: STRENGTHS (MUST CHOOSE TWO): EDUCATION;SUPPORT FROM FAMILY

## 2024-09-30 ASSESSMENT — PATIENT HEALTH QUESTIONNAIRE - PHQ9
1. LITTLE INTEREST OR PLEASURE IN DOING THINGS: NOT AT ALL
2. FEELING DOWN, DEPRESSED OR HOPELESS: NOT AT ALL
SUM OF ALL RESPONSES TO PHQ9 QUESTIONS 1 & 2: 0

## 2024-09-30 ASSESSMENT — LIFESTYLE VARIABLES
AUDIT-C TOTAL SCORE: 0
HOW OFTEN DO YOU HAVE A DRINK CONTAINING ALCOHOL: NEVER
HOW MANY STANDARD DRINKS CONTAINING ALCOHOL DO YOU HAVE ON A TYPICAL DAY: PATIENT DOES NOT DRINK
HOW OFTEN DO YOU HAVE 6 OR MORE DRINKS ON ONE OCCASION: NEVER
AUDIT-C TOTAL SCORE: 0
SKIP TO QUESTIONS 9-10: 1

## 2024-09-30 ASSESSMENT — ACTIVITIES OF DAILY LIVING (ADL): LACK_OF_TRANSPORTATION: NO

## 2024-09-30 ASSESSMENT — PAIN SCALES - GENERAL
PAINLEVEL_OUTOF10: 0 - NO PAIN
PAINLEVEL_OUTOF10: 0 - NO PAIN

## 2024-10-01 ENCOUNTER — ANESTHESIA EVENT (OUTPATIENT)
Dept: OBSTETRICS AND GYNECOLOGY | Facility: HOSPITAL | Age: 29
End: 2024-10-01
Payer: COMMERCIAL

## 2024-10-01 ENCOUNTER — ANESTHESIA (OUTPATIENT)
Dept: OBSTETRICS AND GYNECOLOGY | Facility: HOSPITAL | Age: 29
End: 2024-10-01
Payer: COMMERCIAL

## 2024-10-01 PROBLEM — Z98.890 PONV (POSTOPERATIVE NAUSEA AND VOMITING): Status: ACTIVE | Noted: 2024-10-01

## 2024-10-01 PROBLEM — K21.9 GASTROESOPHAGEAL REFLUX DISEASE: Status: ACTIVE | Noted: 2024-10-01

## 2024-10-01 PROBLEM — R11.2 PONV (POSTOPERATIVE NAUSEA AND VOMITING): Status: ACTIVE | Noted: 2024-10-01

## 2024-10-01 LAB
ALBUMIN SERPL BCP-MCNC: 3.6 G/DL (ref 3.4–5)
ALP SERPL-CCNC: 212 U/L (ref 33–110)
ALT SERPL W P-5'-P-CCNC: 14 U/L (ref 7–45)
ANION GAP SERPL CALC-SCNC: 16 MMOL/L (ref 10–20)
AST SERPL W P-5'-P-CCNC: 16 U/L (ref 9–39)
BILIRUB SERPL-MCNC: 0.3 MG/DL (ref 0–1.2)
BUN SERPL-MCNC: 7 MG/DL (ref 6–23)
CALCIUM SERPL-MCNC: 9.3 MG/DL (ref 8.6–10.6)
CHLORIDE SERPL-SCNC: 105 MMOL/L (ref 98–107)
CO2 SERPL-SCNC: 19 MMOL/L (ref 21–32)
CREAT SERPL-MCNC: 0.67 MG/DL (ref 0.5–1.05)
EGFRCR SERPLBLD CKD-EPI 2021: >90 ML/MIN/1.73M*2
GLUCOSE SERPL-MCNC: 93 MG/DL (ref 74–99)
POTASSIUM SERPL-SCNC: 3.6 MMOL/L (ref 3.5–5.3)
PROT SERPL-MCNC: 6.1 G/DL (ref 6.4–8.2)
SODIUM SERPL-SCNC: 136 MMOL/L (ref 136–145)

## 2024-10-01 PROCEDURE — 2500000001 HC RX 250 WO HCPCS SELF ADMINISTERED DRUGS (ALT 637 FOR MEDICARE OP)

## 2024-10-01 PROCEDURE — 1100000001 HC PRIVATE ROOM DAILY

## 2024-10-01 PROCEDURE — 7210000002 HC LABOR PER HOUR

## 2024-10-01 PROCEDURE — 59409 OBSTETRICAL CARE: CPT | Performed by: ADVANCED PRACTICE MIDWIFE

## 2024-10-01 PROCEDURE — 3700000014 EPIDURAL BLOCK: Mod: GC

## 2024-10-01 PROCEDURE — 7100000016 HC LABOR RECOVERY PER HOUR

## 2024-10-01 PROCEDURE — 59410 OBSTETRICAL CARE: CPT | Performed by: ADVANCED PRACTICE MIDWIFE

## 2024-10-01 PROCEDURE — 2500000005 HC RX 250 GENERAL PHARMACY W/O HCPCS

## 2024-10-01 PROCEDURE — 59300 EPISIOTOMY OR VAGINAL REPAIR: CPT | Performed by: STUDENT IN AN ORGANIZED HEALTH CARE EDUCATION/TRAINING PROGRAM

## 2024-10-01 PROCEDURE — 0DQR0ZZ REPAIR ANAL SPHINCTER, OPEN APPROACH: ICD-10-PCS | Performed by: OBSTETRICS & GYNECOLOGY

## 2024-10-01 PROCEDURE — 10907ZC DRAINAGE OF AMNIOTIC FLUID, THERAPEUTIC FROM PRODUCTS OF CONCEPTION, VIA NATURAL OR ARTIFICIAL OPENING: ICD-10-PCS | Performed by: OBSTETRICS & GYNECOLOGY

## 2024-10-01 PROCEDURE — 51701 INSERT BLADDER CATHETER: CPT

## 2024-10-01 PROCEDURE — 2500000004 HC RX 250 GENERAL PHARMACY W/ HCPCS (ALT 636 FOR OP/ED): Mod: JZ

## 2024-10-01 PROCEDURE — 3E033VJ INTRODUCTION OF OTHER HORMONE INTO PERIPHERAL VEIN, PERCUTANEOUS APPROACH: ICD-10-PCS | Performed by: OBSTETRICS & GYNECOLOGY

## 2024-10-01 PROCEDURE — 2500000001 HC RX 250 WO HCPCS SELF ADMINISTERED DRUGS (ALT 637 FOR MEDICARE OP): Performed by: NURSE PRACTITIONER

## 2024-10-01 RX ORDER — FENTANYL/BUPIVACAINE/NS/PF 2MCG/ML-.1
0-54 PLASTIC BAG, INJECTION (ML) INJECTION CONTINUOUS
Status: DISCONTINUED | OUTPATIENT
Start: 2024-10-01 | End: 2024-10-01

## 2024-10-01 RX ORDER — POLYETHYLENE GLYCOL 3350 17 G/17G
17 POWDER, FOR SOLUTION ORAL 2 TIMES DAILY PRN
Status: DISCONTINUED | OUTPATIENT
Start: 2024-10-01 | End: 2024-10-03 | Stop reason: HOSPADM

## 2024-10-01 RX ORDER — ACETAMINOPHEN 325 MG/1
975 TABLET ORAL EVERY 6 HOURS
Status: DISCONTINUED | OUTPATIENT
Start: 2024-10-01 | End: 2024-10-03 | Stop reason: HOSPADM

## 2024-10-01 RX ORDER — LIDOCAINE 560 MG/1
1 PATCH PERCUTANEOUS; TOPICAL; TRANSDERMAL
Status: DISCONTINUED | OUTPATIENT
Start: 2024-10-01 | End: 2024-10-03 | Stop reason: HOSPADM

## 2024-10-01 RX ORDER — POLYETHYLENE GLYCOL 3350 17 G/17G
17 POWDER, FOR SOLUTION ORAL 2 TIMES DAILY
Status: DISCONTINUED | OUTPATIENT
Start: 2024-10-01 | End: 2024-10-01

## 2024-10-01 RX ORDER — OXYTOCIN/0.9 % SODIUM CHLORIDE 30/500 ML
60 PLASTIC BAG, INJECTION (ML) INTRAVENOUS ONCE AS NEEDED
Status: DISCONTINUED | OUTPATIENT
Start: 2024-10-01 | End: 2024-10-03 | Stop reason: HOSPADM

## 2024-10-01 RX ORDER — NIFEDIPINE 10 MG/1
10 CAPSULE ORAL ONCE AS NEEDED
Status: DISCONTINUED | OUTPATIENT
Start: 2024-10-01 | End: 2024-10-03 | Stop reason: HOSPADM

## 2024-10-01 RX ORDER — BISACODYL 10 MG/1
10 SUPPOSITORY RECTAL DAILY PRN
Status: DISCONTINUED | OUTPATIENT
Start: 2024-10-01 | End: 2024-10-03 | Stop reason: HOSPADM

## 2024-10-01 RX ORDER — FENTANYL/BUPIVACAINE/NS/PF 2MCG/ML-.1
PLASTIC BAG, INJECTION (ML) INJECTION AS NEEDED
Status: DISCONTINUED | OUTPATIENT
Start: 2024-10-01 | End: 2024-10-01

## 2024-10-01 RX ORDER — IBUPROFEN 600 MG/1
600 TABLET ORAL EVERY 6 HOURS
Status: DISCONTINUED | OUTPATIENT
Start: 2024-10-01 | End: 2024-10-03 | Stop reason: HOSPADM

## 2024-10-01 RX ORDER — OXYTOCIN 10 [USP'U]/ML
10 INJECTION, SOLUTION INTRAMUSCULAR; INTRAVENOUS ONCE AS NEEDED
Status: DISCONTINUED | OUTPATIENT
Start: 2024-10-01 | End: 2024-10-03 | Stop reason: HOSPADM

## 2024-10-01 RX ORDER — CEFAZOLIN SODIUM 2 G/100ML
2 INJECTION, SOLUTION INTRAVENOUS ONCE
Status: COMPLETED | OUTPATIENT
Start: 2024-10-01 | End: 2024-10-01

## 2024-10-01 RX ORDER — METRONIDAZOLE 500 MG/100ML
500 INJECTION, SOLUTION INTRAVENOUS ONCE
Status: COMPLETED | OUTPATIENT
Start: 2024-10-01 | End: 2024-10-01

## 2024-10-01 RX ORDER — ADHESIVE BANDAGE
10 BANDAGE TOPICAL
Status: DISCONTINUED | OUTPATIENT
Start: 2024-10-01 | End: 2024-10-03 | Stop reason: HOSPADM

## 2024-10-01 RX ORDER — HYDRALAZINE HYDROCHLORIDE 20 MG/ML
5 INJECTION INTRAMUSCULAR; INTRAVENOUS ONCE AS NEEDED
Status: DISCONTINUED | OUTPATIENT
Start: 2024-10-01 | End: 2024-10-03 | Stop reason: HOSPADM

## 2024-10-01 RX ORDER — ONDANSETRON 4 MG/1
4 TABLET, FILM COATED ORAL EVERY 6 HOURS PRN
Status: DISCONTINUED | OUTPATIENT
Start: 2024-10-01 | End: 2024-10-03 | Stop reason: HOSPADM

## 2024-10-01 RX ORDER — TRANEXAMIC ACID 100 MG/ML
1000 INJECTION, SOLUTION INTRAVENOUS ONCE AS NEEDED
Status: DISCONTINUED | OUTPATIENT
Start: 2024-10-01 | End: 2024-10-03 | Stop reason: HOSPADM

## 2024-10-01 RX ORDER — METHYLERGONOVINE MALEATE 0.2 MG/ML
0.2 INJECTION INTRAVENOUS ONCE AS NEEDED
Status: DISCONTINUED | OUTPATIENT
Start: 2024-10-01 | End: 2024-10-03 | Stop reason: HOSPADM

## 2024-10-01 RX ORDER — DIPHENHYDRAMINE HYDROCHLORIDE 50 MG/ML
25 INJECTION INTRAMUSCULAR; INTRAVENOUS EVERY 6 HOURS PRN
Status: DISCONTINUED | OUTPATIENT
Start: 2024-10-01 | End: 2024-10-03 | Stop reason: HOSPADM

## 2024-10-01 RX ORDER — ONDANSETRON HYDROCHLORIDE 2 MG/ML
4 INJECTION, SOLUTION INTRAVENOUS EVERY 6 HOURS PRN
Status: DISCONTINUED | OUTPATIENT
Start: 2024-10-01 | End: 2024-10-03 | Stop reason: HOSPADM

## 2024-10-01 RX ORDER — LOPERAMIDE HYDROCHLORIDE 2 MG/1
4 CAPSULE ORAL EVERY 2 HOUR PRN
Status: DISCONTINUED | OUTPATIENT
Start: 2024-10-01 | End: 2024-10-03 | Stop reason: HOSPADM

## 2024-10-01 RX ORDER — LABETALOL HYDROCHLORIDE 5 MG/ML
20 INJECTION, SOLUTION INTRAVENOUS ONCE AS NEEDED
Status: DISCONTINUED | OUTPATIENT
Start: 2024-10-01 | End: 2024-10-03 | Stop reason: HOSPADM

## 2024-10-01 RX ORDER — DIPHENHYDRAMINE HCL 25 MG
25 CAPSULE ORAL EVERY 6 HOURS PRN
Status: DISCONTINUED | OUTPATIENT
Start: 2024-10-01 | End: 2024-10-03 | Stop reason: HOSPADM

## 2024-10-01 RX ORDER — CARBOPROST TROMETHAMINE 250 UG/ML
250 INJECTION, SOLUTION INTRAMUSCULAR ONCE AS NEEDED
Status: DISCONTINUED | OUTPATIENT
Start: 2024-10-01 | End: 2024-10-03 | Stop reason: HOSPADM

## 2024-10-01 RX ORDER — MISOPROSTOL 200 UG/1
800 TABLET ORAL ONCE AS NEEDED
Status: DISCONTINUED | OUTPATIENT
Start: 2024-10-01 | End: 2024-10-03 | Stop reason: HOSPADM

## 2024-10-01 RX ORDER — SIMETHICONE 80 MG
80 TABLET,CHEWABLE ORAL 4 TIMES DAILY PRN
Status: DISCONTINUED | OUTPATIENT
Start: 2024-10-01 | End: 2024-10-03 | Stop reason: HOSPADM

## 2024-10-01 RX ORDER — ESCITALOPRAM OXALATE 10 MG/1
10 TABLET ORAL NIGHTLY
Status: DISCONTINUED | OUTPATIENT
Start: 2024-10-01 | End: 2024-10-03 | Stop reason: HOSPADM

## 2024-10-01 SDOH — HEALTH STABILITY: MENTAL HEALTH: CURRENT SMOKER: 0

## 2024-10-01 ASSESSMENT — PAIN SCALES - GENERAL
PAINLEVEL_OUTOF10: 4
PAINLEVEL_OUTOF10: 4
PAINLEVEL_OUTOF10: 5 - MODERATE PAIN
PAINLEVEL_OUTOF10: 0 - NO PAIN
PAINLEVEL_OUTOF10: 0 - NO PAIN
PAINLEVEL_OUTOF10: 4

## 2024-10-01 ASSESSMENT — PAIN DESCRIPTION - DESCRIPTORS
DESCRIPTORS: ACHING;BURNING;DISCOMFORT;SORE
DESCRIPTORS: ACHING;DISCOMFORT;SORE
DESCRIPTORS: ACHING;BURNING;SORE

## 2024-10-01 ASSESSMENT — PAIN DESCRIPTION - LOCATION: LOCATION: PERINEUM

## 2024-10-01 NOTE — SIGNIFICANT EVENT
To room for cervical exam. Patient doing well. Lying in bed comfortably.    SVE: 10/100/0  FHT: 120/mod/+accel/-decel  [place]: q2-3 min     Start second stage   Epidural infusing   Cat 1 fetal tracing     Discussed with Dr. Danny Lopez MD  PGY1, Obstetrics and Gynecology

## 2024-10-01 NOTE — SIGNIFICANT EVENT
To room for cervical exam. Patient now has epidural. Lying in bed comfortably.     SVE: 9.5/100/0  FHT: 115/mod/+accel/recurrent variable decles   [place]: q1-2min     Active labor   Epidural infusing   Pitocin per protocol   Plan for exam in 1 hr or sooner as clinically indicated by maternal or fetal status  CEFM, Cat 2 currently though overall reassuring with low suspicion for acid-base disturbance of the fetus given moderate variability and accelerations    Discussed with Dr. Danny Lopez MD  PGY1, Obstetrics and Gynecology

## 2024-10-01 NOTE — ANESTHESIA PROCEDURE NOTES
Epidural Block    Patient location during procedure: OB  Start time: 10/1/2024 1:42 AM  End time: 10/1/2024 2:11 AM  Reason for block: labor analgesia  Staffing  Performed: resident   Authorized by: Mónica Goodson MD MPH    Performed by: Calista Johnson DO    Preanesthetic Checklist  Completed: patient identified, IV checked, risks and benefits discussed, surgical consent, monitors and equipment checked, pre-op evaluation, timeout performed and sterile techniques followed  Block Timeout  RN/Licensed healthcare professional reads aloud to the Anesthesia provider and entire team: Patient identity, procedure with side and site, patient position, and as applicable the availability of implants/special equipment/special requirements.  Patient on coagulant treatment: no  Timeout performed at: 10/1/2024 1:37 AM  Block Placement  Patient position: sitting  Prep: ChloraPrep  Sterility prep: cap, drape, gloves and mask  Sedation level: no sedation  Patient monitoring: blood pressure, continuous pulse oximetry and heart rate  Approach: midline  Local numbing: lidocaine 1% to skin and subcutaneous tissues  Vertebral space: lumbar  Lumbar location: L3-L4  Epidural  Loss of resistance technique: saline  Guidance: landmark technique        Needle  Needle type: Tuohy   Needle gauge: 17  Needle length: 8.9cm  Needle insertion depth: 5 cm  Catheter type: multi-orifice  Catheter size: 19 G  Catheter at skin depth: 11 cm  Catheter securement method: clear occlusive dressing and liquid medical adhesive    Test dose: lidocaine 1.5% with epinephrine 1-to-200,000  Test dose: lidocaine 1.5% with epinephrine 1-to-200,000  Test dose result: no positive test dose    PCEA  Medication concentration used: 0.044% Bupivacaine with 1.25 mcg/mL Fentanyl and 1:910062 Epinephrine  Dose (mL): 10  Lockout (minutes): 15  1-Hour Limit (boluses/hr): 4  Basal Rate: 14        Assessment  Sensory level: T9. bilateral  Block outcome: patient  comfortable  Number of attempts: 2  Events: no positive test dose  Procedure assessment: patient tolerated procedure well with no immediate complications  Additional Notes  Patient felt lightheaded and nauseous during procedure prior to catheter insertion. Second bolus of 500cc LR given.  Patient remained normotensive during procedure. No emesis.  After second bolus of LR and laying down flat symptoms resolved.

## 2024-10-01 NOTE — PROCEDURES
Third degree laceration repair    30yo  s/p uncomplicated delivery of male infant after induction of labor at 39w3d. See delivery note for details. After delivery of placenta, 3a degree laceration identified and discussed with patient. Rectal examination performed to confirm depth. Anesthesia noted to be adequate. Lighting and patient positioning appropriate as well.     The ends of the EAS were identified and grasped with Allis clamps. The repair was performed in a PISA fashion using 4 interrupted sutures of 2-0 PDS. The sutures were tied down. Rectal exam again repeated and sphincter was felt to be intact.      A midline 2rd degree laceration of the perineum remained, extending approximately 5 cm proximal. This was repaired with 2-0 Monocryl in a running fashion, taking care to re-approximate the deep layers to close the dead space. The suture was continued in a running fashion until the skin of the perineum was closed.     Attention was turned to the left periurethral laceration, which was repaired in a running fashion for re-approximation and hemostasis.     Sponge and needle counts were correct. Total EBL from delivery + repair was 632mL. Ancef and Flagyl to be administered. Discussed with patient regarding her laceration and repair. Discussed scheduled bowel regimen. Discussed close follow up with [facility] and urogynecology.      Leslie Delgado MD  PGY-4, Obstetrics and Gynecology

## 2024-10-01 NOTE — LACTATION NOTE
"Lactation Consultant Note  Lactation Consultation  Reason for Consult: Initial assessment  Consultant Name: EARNESTINE Francisco RN IBCLC    Maternal Information  Has mother  before?: No  Infant to breast within first 2 hours of birth?: Yes  Exclusive Pump and Bottle Feed: No    Maternal Assessment  Breast Assessment: Medium, Symmetrical, Soft, Compressible  Nipple Assessment: Intact, Erect, Short  Areola Assessment: Normal    Infant Assessment  Infant Behavior: Deep sleep    Feeding Assessment  Nutrition Source: Breastmilk  Feeding Method: Nursing at the breast    LATCH TOOL       Breast Pump       Other OB Lactation Tools       Patient Follow-up       Other OB Lactation Documentation  Infant Risk Factors: Early term birth 37-39 weeks    Recommendations/Summary    Infant was 10 hours old at the time of the visit. The mother reports having recently  and the infant was sleeping soundly. The mother feels that the last breastfeeding was much better than the first with the infant being able to stay on the breast better. She is offered assistance when she next breastfeeds. We discussed the following breastfeeding topics: early  feeding patterns and behaviors in the first 24 hours and early days of breastfeeding; the benefits of skin to skin for breastfeeding; frequent feeds with goal of 8-12 feeds/24 hours; early milk production; the importance of a deep latch for maternal comfort and optimal milk production/transfer and milk production; alternating starting breast and allowing the infant to end the feeding; and, the rationale for delaying pumping (unless clinically indicated) and pacifier use until breastfeeding is well-established. The parents were provided with a copy of \"How to Breastfeed Your Baby\" () from the [facility]. The mother has a breast pump for home use. She was given contact information for outpatient lactation services. All questions were answered.    165-Addendum- The mother called out " for assistance with breastfeeding. The infant was awake and actively demonstrating feeding cues. The mother was assisted to latch on both sides using a cross-cradle hold. The infant latches, takes a few sucks, then pulls away seeming frustrated. He remains at the breast longer if the mother's breast are manually expressed and he then latches. He remained at the breast for 3-5 minute  intervals, then needed to be continuously re-latched after manually expressing the mother's breasts.

## 2024-10-01 NOTE — CARE PLAN
The patient's goals for the shift include patient pain control will remain adequate throughout shift.    The clinical goals for the shift include adequate pain control    Over the shift, the patient did not make progress toward the following goals. Barriers to progression include ***. Recommendations to address these barriers include ***.

## 2024-10-01 NOTE — ANESTHESIA PREPROCEDURE EVALUATION
Patient: Kate Hammer    Evaluation Method: In-person visit    Procedure Information    Date: 10/01/24  Procedure: Labor Analgesia         Relevant Problems   Anesthesia   (+) PONV (postoperative nausea and vomiting)      Cardiac (within normal limits)      Pulmonary (within normal limits)      Neuro (within normal limits)      GI   (+) Gastroesophageal reflux disease      /Renal (within normal limits)      Liver (within normal limits)      Hematology (within normal limits)      Musculoskeletal (within normal limits)      GYN   (+) 38 weeks gestation of pregnancy ([facility])   (+) 39 weeks gestation of pregnancy ([facility])   (+) Encounter for supervision of normal pregnancy, antepartum      Digestive   (+) Nausea and vomiting during pregnancy       Clinical information reviewed:   Tobacco  Allergies  Meds   Med Hx  Surg Hx   Fam Hx  Soc Hx        NPO Detail:  No data recorded     Vitals:    10/01/24 0058   BP: (!) 146/82   Pulse: 81   Resp:    Temp: 36.7 °C (98 °F)   SpO2: 100%         OB/Gyn Evaluation    Present Pregnancy    Patient is pregnant now.   Obstetric History                Physical Exam    Airway  Mallampati: III  Neck ROM: full     Cardiovascular   Rate: normal     Dental - normal exam     Pulmonary   Breath sounds clear to auscultation     Abdominal            Anesthesia Plan    History of general anesthesia?: yes  History of complications of general anesthesia?: yes    ASA 2     epidural     The patient is not a current smoker.    Anesthetic plan and risks discussed with patient.  Use of blood products discussed with patient who consented to blood products.    Plan discussed with resident.

## 2024-10-01 NOTE — CARE PLAN
The patient's goals for the shift include patient pain control will remain adequate throughout shift.    The clinical goals for the shift include adequate pain control    Over the shift, the patient did not make progress toward the following goals. Barriers to progression include none. Recommendations to address these barriers include none.

## 2024-10-01 NOTE — L&D DELIVERY NOTE
OB Delivery Note  10/1/2024  Kate Hammer  29 y.o.   Vaginal, Spontaneous       Gestational Age: 39w3d  /Para:   Quantitative Blood Loss: 632mL    Emely Hammer [[id number]]      Labor Events    Sac identifier: Sac 1  Rupture date/time: 10/1/2024 0020  Rupture type: Artificial  Fluid color: Clear  Fluid odor: None  Labor type: Induced Onset of Labor  Labor allowed to proceed with plans for an attempted vaginal birth?: Yes  Induction: Oxytocin  First cervical ripening date/time: 2024  Induction date/time: 2024  Induction indications: Risk Reducing  Complications: None       Labor Event Times    Labor onset date/time: 2024  Dilation complete date/time: 10/1/2024 0358  Start pushing date/time: 10/1/2024 040       Placenta    Placenta delivery date/time: 10/1/2024 0528  Placenta removal: Expressed  Placenta appearance: Intact  Placenta disposition: discarded       Cord    Delayed cord clamping?: Yes  Cord blood disposition: Lab  Gases sent?: No       Lacerations    Episiotomy: None  Perineal laceration: 3rd, 3a-repair by Dr. Aguirre, see separate procedure note   Periurethral laceration?: Yes  Periurethral laceration location: left  Periurethral laceration repaired?: Yes  Repair suture: 2-0 Synthetic Suture       Anesthesia    Method: Epidural       Operative Delivery    Forceps attempted?: No  Vacuum extractor attempted?: No       Shoulder Dystocia    Shoulder dystocia present?: No        Delivery    Birth date/time: 10/1/2024 05:23:00  Delivery type: Vaginal, Spontaneous  Complications: None       Apgars    Living status: Living  Apgar Component Scores:  1 min.:  5 min.:  10 min.:  15 min.:  20 min.:    Skin color:  0  1       Heart rate:  2  2       Reflex irritability:  1  2       Muscle tone:  2  2       Respiratory effort:  2  2       Total:  7  9       Apgars assigned by: SUNNY COOL RN       Delivery Providers    Delivering clinician: Amy Ibrahim,  APRRADHA-CALLIEM   Provider Role    Kathleen Horvath RN Delivery Nurse    Verna Gonzalez, RN Nursery Nurse    Leslie Delgado MD Resident    Vero Lopez MD Resident               3a degree laceration visualized after delivery. Please see procedure note for repair details with Dr. Aguirre.    Leslie Delgado MD    I was present for the entirety of the procedure(s).     Amy Ibrahim, MANI

## 2024-10-01 NOTE — H&P
OB Admission H&P    Assessment/Plan    Kate Hammer is a 29 y.o.  at 39w2d who presents for risk reducing Induction of Labor    Plan:    -Admit to L&D, consented  -T&S, CBC, and Syphilis  -Epidural at patient request  -Recheck as clinically indicated by maternal or fetal status  -Plan to initiate induction with Pitocin, for AROM     Fetal Status  -Cat 1 fetal tracing    -Presentation cephalic based on ultrasound, scanned with Dr. Delgado   -EFW 2891 39%, .5 37% on   -GBS negative    Pregnancy Notables:  -unremarkable     Postpartum:  -Contraception Plan: oral contraceptive    Discussed with Dr. Aguirre and Dr. Danny Lopez MD  PGY1, Obstetrics and Gynecology      Subjective   Reports she is doing well overall. Feeling a little anxious to have a baby. No concerns at this time. Good fetal movement.  Denies vaginal bleeding., Denies leaking of fluid.      Prenatal Provider Dr. Mac    OB History    Para Term  AB Living   2 0 0 0 1 0   SAB IAB Ectopic Multiple Live Births   0 1 0 0 0      # Outcome Date GA Lbr Jose/2nd Weight Sex Type Anes PTL Lv   2 Current            1 IAB 2023 5w0d              Past Surgical History:   Procedure Laterality Date    SHOULDER SURGERY  2017    Shoulder Surgery       Social History     Tobacco Use    Smoking status: Never    Smokeless tobacco: Never   Substance Use Topics    Alcohol use: Not Currently       No Known Allergies    Medications Prior to Admission   Medication Sig Dispense Refill Last Dose    doxylamine-pyridoxine, vit B6, 10-10 mg tablet,delayed release (DR/EC) Take 1 tablet by mouth 2 times a day. 60 tablet 1 2024    famotidine (Pepcid) 20 mg tablet Take 1 tablet (20 mg) by mouth 2 times a day.   2024    ondansetron (Zofran) 4 mg tablet Take 1 tablet (4 mg) by mouth every 6 hours if needed for nausea. 30 tablet 2 2024    escitalopram (Lexapro) 10 mg tablet Take 1 tablet (10 mg) by mouth once daily.         Objective     Last Vitals  Temp Pulse Resp BP MAP O2 Sat   36.4 °C (97.5 °F) 91 16 (!) 132/91 101 96 %     Blood Pressures         9/30/2024 2018 9/30/2024 2019 9/30/2024 2209       BP: 133/88 133/88 132/91              Physical Exam  General: NAD, mood appropriate  Cardiopulmonary: warm and well perfused, breathing comfortably on room air  Abdomen: Gravid, non-tender  Extremities: Symmetric  Speculum Exam: deferred  Cervix: 2 /70 /-2      Fetal Monitoring  Baseline: 130 bpm, Variability: moderate,  Accelerations: present and Decelerations: none  Uterine Activity: q4-5 minutes  Interpretation: Category 1    Bedside ultrasound: Yes    Labs in chart were reviewed.   Results from last 7 days   Lab Units 09/30/24  2046   WBC AUTO x10*3/uL 10.6   HEMOGLOBIN g/dL 11.5*   HEMATOCRIT % 33.8*   PLATELETS AUTO x10*3/uL 267        Prenatal labs reviewed, not remarkable.

## 2024-10-01 NOTE — LACTATION NOTE
Lactation Consultant Note  Lactation Consultation  Reason for Consult: Follow-up assessment, Other (Comment) (asked by EARNESTINE Francisco RN, IBCLC to assess latch)  Consultant Name: Saira Pugh RN, IBCLC    Maternal Information       Maternal Assessment  Breast Assessment: Medium, Symmetrical, Compressible, Other (Comment) (expressible)  Nipple Assessment: Intact, Erect, Short, Rounded after feeding  Areola Assessment: Normal    Infant Assessment  Infant Behavior: Sleepy, Readiness to feed, Feeding cues observed, Suckles on and off, needs stimulation, Content after feeding  Infant Assessment: Palate - high/arch/bubble/normal, Good cupping of tongue, Tongue protrudes over alveolar ridge, Able to elevate tongue to roof of mouth    Feeding Assessment  Nutrition Source: Breastmilk  Feeding Method: Nursing at the breast  Feeding Position: C - hold, Football/seated, One side per feeding, Nipple to nose, Mother needs assistance with latch/positioning  Suck/Feeding: Sustained, Coordinated suck/swallow/breathe, Tactile stimulation needed, Swallowing intermittently only with encouragement  Latch Assessment: Moderate assistance is needed, Instructed on deep latch, Areolar attachment, Deep latch obtained, Optimal angle of mouth opening, Chin and lower lip contact breast first, Flanged lips, Comfortable latch    LATCH TOOL  Latch: Repeated attempts, hold nipple in mouth, stimulate to suck  Audible Swallowing: A few with stimulation  Type of Nipple: Everted (After stimulation)  Comfort (Breast/Nipple): Soft/non-tender  Hold (Positioning): Minimal assist, teach one side, mother does other, staff holds  LATCH Score: 7    Breast Pump       Other OB Lactation Tools       Patient Follow-up  Outpatient Lactation Follow-up: Recommended    Other OB Lactation Documentation  Maternal Risk Factors: Extreme tiredness, fatigue or stress  Infant Risk Factors: Early term birth 37-39 weeks, Infant Apgar <8, Other (comment) (1- minute= 7  apgar)    Recommendations/Summary  Asked by other RN, IBCLC to assist with latching baby to the breast to evaluate why baby is coming on and off the breast- not maintaining the latch.     Offered to assist and mom was agreeable.   Reviewed positioning of baby (in football hold on the left breast), use of pillow support, hand placement on baby and on breast, expression of colostrum to the nipple prior to latching (mom is expressible),  latching technique, and use of breast compression and stimulation to keep the baby feeding at the breast longer.    Deep latch obtained and mom stated the latch as comfortable.   Noted swallows with stimulation.   Baby came off the breast and was content.   Placed baby in skin to skin and notified RN     Noted mom needs to keep her breast supported and compressed in a C-hold to keep the baby latched to the breast. Attempted several times to have mom let go of her breast after latch established but, baby keeps losing the latch.   Also, baby did tongue thrust a few times on my finger with oral assessment- suck training provided. As long as mom brought the chin to the breast first to elicit a wide open gape- the baby did not push mom's breast out of the mouth.     Reviewed feeding cues, the normal feeding patterns in the first 24 hours of life, the role of colostrum, output on different days of life, milk production/ supply in the first few days of life, and the benefits of skin to skin.      Encouraged mom to breastfeed on demand with a goal of 8-12 feeds in a 24 hour period.   If baby is not showing feeding cues and it has been 3 hours since the last time the baby was fed or the last time the baby attempted to feed, encouraged her to place baby in skin to skin.    Encouraged her to call for assistance if needed.     Questions answered.

## 2024-10-01 NOTE — SIGNIFICANT EVENT
Patient pushing.    SVE: 10/100/0  FHT: 135/mod/-accel/-decel  [place]: q2min     Good maternal effort, continue second stage  Cat 1 fetal tracing     Vero Lopez MD  PGY1, Obstetrics and Gynecology

## 2024-10-01 NOTE — SIGNIFICANT EVENT
Patient meets criteria for home monitoring of blood pressure post discharge.  Reason:  currently has 2 mild range blood pressures > 4 hours apart. Met with patient to assess for availability of home BP monitor.  Patient stated she owns home BP monitor. . Patient educated on importance of continuing to monitor BP at home, recording BP on home monitoring log and s/sx of when to call her provider.  Pt verbalized understanding the above information.

## 2024-10-01 NOTE — SIGNIFICANT EVENT
To room for AROM. Patient lying in bed comfortably. Notes she isn't really feeling contractions.     SVE: 3/80/-2  FHT: 150/mod/+accel/-decel  [place]: q1-2min     Latent labor, progressing  Cont Pitocin per protocol  Epidural per patient request   Next exam as clinically indicated by maternal or fetal status  CEFM, Cat 1 fetal tracing     Discussed with Dr. Danny Lopez MD  PGY1, Obstetrics and Gynecology

## 2024-10-02 PROBLEM — O13.9 GESTATIONAL HYPERTENSION (HHS-HCC): Status: ACTIVE | Noted: 2024-10-02

## 2024-10-02 PROCEDURE — 2500000001 HC RX 250 WO HCPCS SELF ADMINISTERED DRUGS (ALT 637 FOR MEDICARE OP): Performed by: NURSE PRACTITIONER

## 2024-10-02 PROCEDURE — 1100000001 HC PRIVATE ROOM DAILY

## 2024-10-02 PROCEDURE — 2500000001 HC RX 250 WO HCPCS SELF ADMINISTERED DRUGS (ALT 637 FOR MEDICARE OP)

## 2024-10-02 PROCEDURE — 2500000001 HC RX 250 WO HCPCS SELF ADMINISTERED DRUGS (ALT 637 FOR MEDICARE OP): Performed by: STUDENT IN AN ORGANIZED HEALTH CARE EDUCATION/TRAINING PROGRAM

## 2024-10-02 RX ORDER — DOCUSATE SODIUM 100 MG/1
100 CAPSULE, LIQUID FILLED ORAL 2 TIMES DAILY
Status: DISCONTINUED | OUTPATIENT
Start: 2024-10-02 | End: 2024-10-03 | Stop reason: HOSPADM

## 2024-10-02 RX ORDER — NORETHINDRONE 0.35 MG/1
1 TABLET ORAL DAILY
Qty: 28 TABLET | Refills: 2 | Status: SHIPPED | OUTPATIENT
Start: 2024-10-02 | End: 2024-12-25

## 2024-10-02 ASSESSMENT — PAIN SCALES - GENERAL
PAIN_LEVEL: 1
PAINLEVEL_OUTOF10: 3
PAINLEVEL_OUTOF10: 5 - MODERATE PAIN

## 2024-10-02 ASSESSMENT — PAIN DESCRIPTION - DESCRIPTORS
DESCRIPTORS: ACHING;SORE
DESCRIPTORS: ACHING;DISCOMFORT;SORE
DESCRIPTORS: ACHING;DISCOMFORT;SORE

## 2024-10-02 ASSESSMENT — PAIN DESCRIPTION - LOCATION
LOCATION: VAGINA
LOCATION: PERINEUM

## 2024-10-02 NOTE — CARE PLAN
The patient's goals for the shift include   Problem: Pain - Adult  Goal: Verbalizes/displays adequate comfort level or baseline comfort level  Outcome: Progressing     Problem: Safety - Adult  Goal: Free from fall injury  Outcome: Progressing     Problem: Postpartum  Goal: Experiences normal postpartum course  Outcome: Progressing           VSS, bleeding and swelling minimal, pain controlled with medications, breastfeeding.    Spiral Flap Text: The defect edges were debeveled with a #15 scalpel blade.  Given the location of the defect, shape of the defect and the proximity to free margins a spiral flap was deemed most appropriate.  Using a sterile surgical marker, an appropriate rotation flap was drawn incorporating the defect and placing the expected incisions within the relaxed skin tension lines where possible. The area thus outlined was incised deep to adipose tissue with a #15 scalpel blade.  The skin margins were undermined to an appropriate distance in all directions utilizing iris scissors.

## 2024-10-02 NOTE — CARE PLAN
The patient's goals for the shift include patient pain control will remain adequate throughout shift.    The clinical goals for the shift include adequate pain control

## 2024-10-02 NOTE — LACTATION NOTE
Lactation Consultant Note  Lactation Consultation  Reason for Consult: Follow-up assessment  Consultant Name: Yi Cowart RN IBCLC    Maternal Information  Has mother  before?: No  Infant to breast within first 2 hours of birth?: Yes  Exclusive Pump and Bottle Feed: No    Maternal Assessment  Breast Assessment: Medium, Symmetrical, Soft  Nipple Assessment: Intact, Short  Areola Assessment: Normal    Infant Assessment  Infant Behavior: Awake, Readiness to feed, Feeding cues observed  Infant Assessment: Able to elevate tongue to roof of mouth, Good lateral movement of tongue, Good cupping of tongue    Feeding Assessment  Nutrition Source: Breastmilk  Feeding Method: Nursing at the breast  Feeding Position: Football/seated  Suck/Feeding: Sustained  Latch Assessment: Latch achieved after repeated attempts, Eagerly grasped on to latch    LATCH TOOL  Latch: Grasps breast, tongue down, lips flanged, rhythmic sucking  Audible Swallowing: Spontaneous and intermittent (24 hours old)  Type of Nipple: Everted (After stimulation)  Comfort (Breast/Nipple): Soft/non-tender  Hold (Positioning): Minimal assist, teach one side, mother does other, staff holds  LATCH Score: 9    Breast Pump  Pump: None    Patient Follow-up  Inpatient Lactation Follow-up Needed : Yes  Outpatient Lactation Follow-up: Recommended    Other OB Lactation Documentation  Maternal Risk Factors: Hypertension    Recommendations/Summary  Mom states that baby was sleepy overnight and was only feeding for about 3-5 mins at a time. Discussed that since baby was under 24 hours of life at that time, this was normal feeding behavior and that today, we expect baby to wake up more and to feed at the breast for 15-20 mins at a time. Mom expressed understanding. Baby was awake in the bassinet and was showing hunger cues. We positioned baby in football hold at the right breast with good body support. Baby opened wide to latch but it took a few attempts for baby to  achieve a deep latch and to sustain the latch for more than a few sucks. I showed parents how to bring baby to the breast chin first and to support his neck and shoulders while bringing him onto the breast and throughout the feed. Ultimately, baby achieved a deep latch with flanged lips and areolar attachment. Instructed mom to continue to support her breast during the feed so that baby would not slip off. Reviewed breast compression and infant stimulation techniques. Baby fed for at least 15 mins and continued to feed as I left the room. Several swallows noted, a few audible. Mom has a pump for at home. Her nipples measure 16 mm in diameter so I recommended size 18 mm flanges. We discussed how to go about ordering these for her pump at home. We reviewed the outpatient lactation information. Encouraged mom to continue to attempt to feed every 2-3 hours and to call out for assistance.

## 2024-10-02 NOTE — PROGRESS NOTES
Postpartum Progress Note    Assessment/Plan   Kate Hammer is a 29 y.o., , who was admitted for rrIOL, delivered at 39w3d gestation and is now postpartum day 1 s/p .     Routine postpartum care  - meeting all milestones  - 3a + L periurethral lacs,  mL   - bonding with male infant  - pain well controlled on po medications  - DVT Score: 3 - encourage ambulation and  SCDs  - B+, Rhogam not indicated  - admission hgb: 11.5  - PPBC: POPs    gHTN  - diagnosed by 2 mild range BPs > 4 hours apart while inpatient   - mild HA today, just received tylenol/ibuprofen prior to my assessment -> will fu response   - o/w asymptomatic  - BP normotensive x 24 hours now   - no meds  - HELLP labs negative   - will continue to monitor, discussed 3 day stay for gHTN and pt states understanding  - BP cuff for home     3a laceration   - voiding, has not had BM  - bowel regimen ordered   - fu with [facility] clinic, referral d/w pt     Maternal Well-Being  - emotional support provided     Feeding  - breastfeeding/pumping encouraged; lactation consult prn    Dispo:  anticipate d/c on PPD #3 if BP well-controlled and meeting all postpartum milestones, for f/u 1 week for BP check and 1 month with Primary OB provider    DOMINGA Alberto    Principal Problem:    39 weeks gestation of pregnancy ([facility])  Active Problems:    PONV (postoperative nausea and vomiting)    Gastroesophageal reflux disease    Pregnancy Problems (from 24 to present)       Problem Noted Resolved    39 weeks gestation of pregnancy ([facility]) 2024 by Vero Lopez MD No    Priority:  Medium      38 weeks gestation of pregnancy ([facility]) 2024 by Maria De Jesus Mac MD No    Priority:  Medium      Overview Addendum 2024  8:12 AM by Maria De Jesus Mac MD     Desired provider in labor: [] CNM  [x] Physician  [x] Blood Products: [x] Yes, accepts [] No, needs counseling  [x] Initial BMI: 20.38   [x] Prenatal Labs: B+,  Rubella immune, Hgb 12.7 (first trimester) -> 11.4 (second trimester)  [x] Cervical Cancer Screening up to date: ASCUS/HPV neg in 3/2024: repeat in 3 years  [x] Rh status: positive  [x] Genetic Screening:  Neg carrier testing and rr cfDNA  [x] NT US: (11-13 wks): Not done  [x] Baby ASA (if indicated): Not indicated  [x] Pregnancy dated by: LMP consistent with 8 week ultrasound at OSH    [x] Anatomy US: (19-20 wks): Normal  [x] Federal Sterilization consent signed (if indicated): NA  [x] 1hr GCT at 24-28wks: Nromal, 121  [x] Rhogam (if indicated): Not indicated  [x] Fetal Surveillance (if indicated): Not indicated  [x] Tdap (27-32 wks, may be given up to 36 wks if initial window missed): given   [x] RSV (32-36 wks) (Sept. to end ): NA, too late in pregnancy when RSV rolled out  [x] Flu Vaccine: given 24    [x] Breastfeeding: plans to bf, has pump  [x] Postpartum Birth control method: Planning for POPs  [x] GBS at 36 - 37 wks: negative   [x] 39 weeks discussion of IOL vs. Expectant management: Desires 39 week IOL, scheduled for   [x] Mode of delivery ( anticipated ): Plan            Hemorrhoids during pregnancy in third trimester ([facility]) 2024 by Lupe Haley MD No    Priority:  Medium      Encounter for supervision of normal pregnancy, antepartum 2024 by Lupe Haley MD No    Priority:  Medium      Nausea and vomiting during pregnancy 2024 by Lupe Haley MD No    Priority:  Medium      Overview Addendum 2024  4:04 PM by Maria De Jesus Mac MD     Tolerable, using pepcid for heartburn         Uterine size-date discrepancy in third trimester ([facility]) 2024 by Lupe Haley MD 2024 by Lupe Haley MD          Subjective   Her pain is well controlled with current medications  She is passing flatus  She is ambulating well  She is tolerating a Adult diet Regular  She reports no breast or nursing problems  She denies emotional  concerns today      C/o HA today. Denies scotoma, CP, SOB, or RUQ pain.    Objective   Allergies:   Patient has no known allergies.         Last Vitals:  Temp Pulse Resp BP MAP Pulse Ox   36.8 °C (98.3 °F) 79 18 126/78   99 %     Vitals Min/Max Last 24 Hours:  Temp  Min: 36.2 °C (97.2 °F)  Max: 36.9 °C (98.4 °F)  Pulse  Min: 66  Max: 107  Resp  Min: 18  Max: 18  BP  Min: 116/67  Max: 131/72    Intake/Output:   No intake or output data in the 24 hours ending 10/02/24 1325    Physical Exam:  General: well appearing, well-nourished, postpartum  Obstetric: abdomen soft/non-tender, fundus firm below umbilicus, lochia light  Skin: No rashes/lesions/erythema  Neuro: A/Ox3, conversational  GI: +flatus  Respiratory: Even and unlabored on RA  Extremities: No edema, discoloration, or pain in BLE, equal and palpable DP and PT pulses    Psych: appropriate mood and affect     Lab Data:  Lab Results   Component Value Date    WBC 10.6 09/30/2024    HGB 11.5 (L) 09/30/2024    HCT 33.8 (L) 09/30/2024     09/30/2024     Lab Results   Component Value Date    GLUCOSE 93 09/30/2024     09/30/2024    K 3.6 09/30/2024     09/30/2024    CO2 19 (L) 09/30/2024    ANIONGAP 16 09/30/2024    BUN 7 09/30/2024    CREATININE 0.67 09/30/2024    EGFR >90 09/30/2024    CALCIUM 9.3 09/30/2024    ALBUMIN 3.6 09/30/2024    PROT 6.1 (L) 09/30/2024    ALKPHOS 212 (H) 09/30/2024    ALT 14 09/30/2024    AST 16 09/30/2024    BILITOT 0.3 09/30/2024

## 2024-10-02 NOTE — ANESTHESIA POSTPROCEDURE EVALUATION
Kate Hammer is a 29 y.o., , who had a Vaginal, Spontaneous delivery on 10/1/2024 at 39w3d and is now POD1.    She had Neuraxial Anesthesia without immediate complications noted.       Pain well controlled    Vitals:    10/02/24 0414   BP: 118/75   Pulse: 98   Resp: 18   Temp: 36.2 °C (97.2 °F)   SpO2: 98%       Neuraxial site assessed. No visible redness or swelling or drainage. Patient able to ambulate and move all extremities without difficulty. Able to void. No complaints of nausea/vomiting. Tolerating PO intake well. No s/sx of PDPH.     Anesthesia will sign off     TORO Garcia      Anesthesia Post Evaluation    Patient location during evaluation: bedside  Patient participation: complete - patient participated  Level of consciousness: awake  Pain score: 1  Pain management: adequate  Airway patency: patent  Cardiovascular status: acceptable  Respiratory status: acceptable  Hydration status: acceptable  Postoperative Nausea and Vomiting: none        No notable events documented.

## 2024-10-03 VITALS
HEIGHT: 68 IN | BODY MASS INDEX: 28.4 KG/M2 | OXYGEN SATURATION: 97 % | TEMPERATURE: 98.4 F | DIASTOLIC BLOOD PRESSURE: 75 MMHG | WEIGHT: 187.39 LBS | HEART RATE: 77 BPM | RESPIRATION RATE: 16 BRPM | SYSTOLIC BLOOD PRESSURE: 124 MMHG

## 2024-10-03 PROCEDURE — 2500000001 HC RX 250 WO HCPCS SELF ADMINISTERED DRUGS (ALT 637 FOR MEDICARE OP)

## 2024-10-03 PROCEDURE — 2500000001 HC RX 250 WO HCPCS SELF ADMINISTERED DRUGS (ALT 637 FOR MEDICARE OP): Performed by: STUDENT IN AN ORGANIZED HEALTH CARE EDUCATION/TRAINING PROGRAM

## 2024-10-03 ASSESSMENT — PAIN SCALES - GENERAL
PAINLEVEL_OUTOF10: 3
PAINLEVEL_OUTOF10: 3

## 2024-10-03 ASSESSMENT — PAIN DESCRIPTION - DESCRIPTORS: DESCRIPTORS: ACHING;SORE

## 2024-10-03 ASSESSMENT — PAIN DESCRIPTION - LOCATION: LOCATION: ABDOMEN

## 2024-10-03 NOTE — DISCHARGE SUMMARY
Discharge Summary    Admission Date: 2024  Discharge Date: 10/03/24      Discharge Diagnosis   (normal spontaneous vaginal delivery) ([facility])    Hospital Course  Delivery Date: 10/1/2024 5:23 AM  Delivery type: Vaginal, Spontaneous   GA at delivery: 39w3d  Outcome: Living  Anesthesia during delivery: Epidural  Intrapartum complications: None  Feeding method: Breastfeeding Status: Yes     Procedures: none  Contraception at discharge: none  Very pleasant.  Fob at bedside.  Discussed bp's pt. Knew last bp of 152/91 was not done correctly, asked for recheck that was wnl.  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  Wants discharge today.  BP's well controlled.  ERAD referral sent, pt. Aware.      Last Vitals:  Temp Pulse Resp BP MAP Pulse Ox   36.9 °C (98.4 °F) 77 16 124/75 91 97 %     Discharge Meds     Your medication list        START taking these medications        Instructions Last Dose Given Next Dose Due   norethindrone 0.35 mg tablet  Commonly known as: Micronor      Take 1 tablet (0.35 mg) over 28 days by mouth once daily.              CONTINUE taking these medications        Instructions Last Dose Given Next Dose Due   doxylamine-pyridoxine (vit B6) 10-10 mg tablet,delayed release (DR/EC)      Take 1 tablet by mouth 2 times a day.       escitalopram 10 mg tablet  Commonly known as: Lexapro           famotidine 20 mg tablet  Commonly known as: Pepcid           ondansetron 4 mg tablet  Commonly known as: Zofran      Take 1 tablet (4 mg) by mouth every 6 hours if needed for nausea.                 Where to Get Your Medications        These medications were sent to [facility]/pharmacy #[id number] - [place], [place] - [address]  [address] [place] [address]      Phone: [phone]   norethindrone 0.35 mg tablet          Complications Requiring Follow-Up  Heavy vaginal bleeding, passing clots, fever and/or chills      Test  Results Pending At Discharge  Pending Labs       No current pending labs.            Outpatient Follow-Up  No future appointments.    I spent 15 minutes in the professional and overall care of this patient.      Kellie Pimentel, JEAN-CNP

## 2024-10-03 NOTE — CARE PLAN
The patient's goals for the shift include to be discharged home    The clinical goals for the shift include Stable VS this shift    VSS, pain well controlled, bleeding minimal, breastfeeding well.

## 2024-10-08 ENCOUNTER — OFFICE VISIT (OUTPATIENT)
Dept: OBSTETRICS AND GYNECOLOGY | Facility: CLINIC | Age: 29
End: 2024-10-08
Payer: COMMERCIAL

## 2024-10-08 VITALS — DIASTOLIC BLOOD PRESSURE: 90 MMHG | WEIGHT: 170 LBS | SYSTOLIC BLOOD PRESSURE: 124 MMHG | BODY MASS INDEX: 25.85 KG/M2

## 2024-10-08 DIAGNOSIS — O13.9 GESTATIONAL HYPERTENSION, ANTEPARTUM (HHS-HCC): Primary | ICD-10-CM

## 2024-10-08 PROCEDURE — 99213 OFFICE O/P EST LOW 20 MIN: CPT | Performed by: OBSTETRICS & GYNECOLOGY

## 2024-10-08 PROCEDURE — 3074F SYST BP LT 130 MM HG: CPT | Performed by: OBSTETRICS & GYNECOLOGY

## 2024-10-08 PROCEDURE — 3080F DIAST BP >= 90 MM HG: CPT | Performed by: OBSTETRICS & GYNECOLOGY

## 2024-10-08 NOTE — PROGRESS NOTES
Assessment   IMPRESSIONS:    1. Gestational hypertension, antepartum ([facility])  - BP borderline today at 124/90, home Bps normal, not on meds, reviewed si/sx of PEC. No changes to management. Continue to monitor at home.     2. Type 3a perineal laceration during delivery ([facility])  - referral to ER[facility] clinic in place    Follow up for 4-6 week PP visit    Maria De Jesus Mac MD    Subjective   29 y.o.  presenting for postpartum follow-up     Delivery Date: 10/1/24  GA at Delivery: 39w3d  Type of Delivery:     Pregnancy/delivery notable for: Gestational HTN, not on meds, 3rd degree laceration    Concerns: None    Pain: controlled  Lacerations: 3a laceration  Lochia: Not discussed today   Menses: Not yet   Sexual Intimacy: not yet  Contraceptive Method: POP sent from hospital  Feeding Method: Breast feeding  Lactation Consult Needed?: Yes, has information to scheduled  Bonding with Baby: well   Mood:   some worsening anxiety, desires to monitor and is no improvement may increase dose of lexapro    Objective   Vitals:    10/08/24 1112   BP: 124/90       PHYSICAL EXAM  Objective   /90   Wt 77.1 kg (170 lb)   LMP 2023 (Approximate)   Breastfeeding Yes   BMI 25.85 kg/m²      General:   Alert and oriented, in no acute distress       Extremities: No edema                               Psych Normal affect. Normal mood.

## 2024-10-09 ENCOUNTER — TELEPHONE (OUTPATIENT)
Dept: LACTATION | Facility: CLINIC | Age: 29
End: 2024-10-09
Payer: COMMERCIAL

## 2024-10-10 ENCOUNTER — LACTATION CONSULT (OUTPATIENT)
Dept: LACTATION | Facility: CLINIC | Age: 29
End: 2024-10-10
Payer: COMMERCIAL

## 2024-10-10 DIAGNOSIS — O92.70 LACTATION DISORDER (HHS-HCC): Primary | ICD-10-CM

## 2024-10-10 PROCEDURE — 99211 OFF/OP EST MAY X REQ PHY/QHP: CPT | Performed by: OBSTETRICS & GYNECOLOGY

## 2024-10-10 NOTE — PROGRESS NOTES
Lactation Counseling Note    Subjective:    Kate Hammer is a 29 y.o. patient referred for lactation counseling. She is here today due to Latch issues. She was referred by her  self .     OB HISTORY:   Healthcare Provider: OB/GYN Dr Mac  Prenatal Screening: Negative  /Para: : 2  Para: 1  Weeks Gestation: 39  Mode of Delivery: Normal vaginal route  Epidural/General Anesthesia  Maternal Complications: GHTN post delivery  [place] Information: Baby's Name: Vinod Hammer  : 10[birth year]  Place of Birth: [facility]  Skin to skin contact: First hour  Birth weight: 7 lb 7 oz  Birth length: 20 inches    Objective:    BREASTFEEDING ASSESSMENT:   Physical Exam    Baby Name: Vinod Hammer  Breast Assessment: Medium, Symmetrical, Filling, Soft, Warm, and Compressible  Nipple Assessment/Stage: intact, erect, compression stripe, rounded after feeding, and scabbed Stage II - Abrasions, shallow crack or fissure, stripe  Areola: Normal  Feeding Position: breast sandwich, cross - cradle, skin to skin, mother demonstrates good positioning, and mother needs assistance with latch/positioning  Latch: minimal assistance is needed, instructed on deep latch, eagerly grasped on to latch, deep latch obtained, sucking and swallowing, flanged lips, and comfortable latch  Suck/Feeding: sustained, audible swallowing, and content after feeding  Infant Behavior: awake, quiet alert, feeding cues observed, sucking, and content after feeding  Infant Information: Palate- high/arch/bubble/normal  Good cupping of tongue  Good lateral movement of the tongue  Able to elevate tongue to roof of mouth  Breast Pain: Pain scale 0-10 (10 most pain): 0  Nipple Pain: Pain scale 0-10 (10 most pain): 0  Weight: Reported pediatrician visit weight: 6 lb 11 oz  Date of pediatrician weight: 10/5/24  Weight before feedin gm  Weight after feedin gm  Amount of breast milk transferred: 81 ml  Number of voids in the last 24 hours: 7  Number of  "stools in the last 24 hours: 4    PATIENT DISCUSSION SUMMARY:  Mother and infant seen for concerns over a latch and weight check.    Mother has been feeding on demand.   Mother's milk in and milk easily expressible.    Infant weight gain appropriate.  Infant alert with moist mucous membranes.  Wet diaper in office.    Mother has compression stripes and scabbing from poor latches in the hospital. Mother reports that nipples are healing a feel a lot better than they did the first few days. Mother's nipples erect and breasts soft and infant able sustain latch for entire feeding.     Suck assessment normal.     Reviewed to start offering \"practice\" bottles in between 4-6 weeks and focus on exclusive breastfeeding for now.   Discussed to bottle feed with more upright positioning and catherine lips to help infant have wide latch when using bottle by pulling down on chin and everting upper lip.    Pumping session observed with  Spectra S1 pump with 18 mm flanges.  Discussed increasing vacuum to comfort, use of massage and compression, and hand expression after pumping to improve breast emptying.  Mother shown technique for hand expression. Mother pumping for just a few minutes because she wanted a demonstration of using the breast pump.     Plan:  Mother to continue feeding infant on demand. Mother to shape breast and wait for infant to open widely before bringing him in closely to ensure a deep latch. Mother to keep exclusive breastfeeding and to start offering a bottle in a few weeks. Infant with appropriate weight gain with a deep and comfortable latch.     Will f/u with lactation as needed and with pediatrician on 10/16/24    Denies questions.  LACTATION PROBLEMS AND STRATEGIES:  Sore nipple (early): After feeding use approved nipple cream or hydrogel pads  Allow baby to self latch  Begin feeding with least sore breast  Between feedings use breast shells with large openings to reduce clothing friction or pressure  Break " "suction with finger before removing baby from breast  Breastfeed first on the least sore breast until milk releases, then move baby gently to the more painful breast  Check positioning and attachment throughout the feeding  Contact physician for nipple evaluation and treatment  Discontinue use of bottles and pacifiers until soreness subsides  Do not rub expressed milk into broken skin  Do not use plastic lined breast pads. Change breast pads frequently  Dr. Brown's \"Sore Nipples-Engorgement\" Stephanie publishing 2005  Expose nipples to sunlight  If engorged, express to soften areola  If experiencing burning or stinging nipple, check for thrush  If nipple pain is too intense to continue breastfeeding, express breastmilk and feed baby expressed breastmilk  If nipple skin is broken, apply a thin coating of a topical antibiotic to prevent infection  Massage breasts and hand express a small amount of milk to encourage let down before feeding  PI sheet on BF with sore and cracked nipples given  Place baby skin-to-skin on your bare chest  Purified lanolin ointment maybe used on nipples after feedings for comfort  Stimulate a milk ejection by expressing some milk before putting baby to breast  Try holding the baby in different positions (vertical, horizontal, or at 45 degree angle ) for feedings  Use nipple shield to reduce pain during breastfeeding  Use relaxation and breathing techniques to help with pain of latch on  Wash hands before touching breasts and nipples. Rinse nipples with clean warm water  Watch for feeding cues and feed baby  When sore on bottom, make sure lower lip is flanged out and relaxed  When sore on top, correct the latch on and check tongue position  When the tip is sore, center the nipple when latching on, check for tongue thrusting, and check for short frenulum  PATIENT INSTRUCTION HANDOUTS GIVEN:   Tips for pumping with an electric breast pump and milk storage for your healthy baby (PI# 123)  Pumping and " storing breast milk for your infant in the hospital (I# 124)  How to keep your breast pump kit clean  Foods that promote good milk production  Breastfeeding: Tips to increase your milk supply (PI# 162)  Breastfeeding: Sore and cracked nipples (PI# 167)  Breastfeeding: Plugged milk ducts/mastitis (PI# 164)  Breastfeeding: Engorgement (PI# 163)  LACTATION EDUCATION:  Correct Positioning, Correct Latch On, and Demo use of Pump

## 2024-10-18 ENCOUNTER — OFFICE VISIT (OUTPATIENT)
Dept: OBSTETRICS AND GYNECOLOGY | Facility: CLINIC | Age: 29
End: 2024-10-18
Payer: COMMERCIAL

## 2024-10-18 VITALS
TEMPERATURE: 97.3 F | RESPIRATION RATE: 16 BRPM | OXYGEN SATURATION: 98 % | DIASTOLIC BLOOD PRESSURE: 85 MMHG | SYSTOLIC BLOOD PRESSURE: 125 MMHG | HEIGHT: 68 IN | BODY MASS INDEX: 25.01 KG/M2 | WEIGHT: 165 LBS | HEART RATE: 100 BPM

## 2024-10-18 PROCEDURE — 99214 OFFICE O/P EST MOD 30 MIN: CPT | Performed by: STUDENT IN AN ORGANIZED HEALTH CARE EDUCATION/TRAINING PROGRAM

## 2024-10-18 ASSESSMENT — ENCOUNTER SYMPTOMS
DEPRESSION: 0
OCCASIONAL FEELINGS OF UNSTEADINESS: 0
LOSS OF SENSATION IN FEET: 0

## 2024-10-18 ASSESSMENT — PATIENT HEALTH QUESTIONNAIRE - PHQ9
1. LITTLE INTEREST OR PLEASURE IN DOING THINGS: NOT AT ALL
2. FEELING DOWN, DEPRESSED OR HOPELESS: NOT AT ALL
SUM OF ALL RESPONSES TO PHQ9 QUESTIONS 1 AND 2: 0

## 2024-10-18 ASSESSMENT — LIFESTYLE VARIABLES
HOW OFTEN DO YOU HAVE A DRINK CONTAINING ALCOHOL: NEVER
HOW MANY STANDARD DRINKS CONTAINING ALCOHOL DO YOU HAVE ON A TYPICAL DAY: PATIENT DOES NOT DRINK
SKIP TO QUESTIONS 9-10: 1
HOW OFTEN DO YOU HAVE SIX OR MORE DRINKS ON ONE OCCASION: NEVER
AUDIT-C TOTAL SCORE: 0

## 2024-10-18 ASSESSMENT — PAIN SCALES - GENERAL: PAINLEVEL_OUTOF10: 6

## 2024-10-18 NOTE — PROGRESS NOTES
[facility] Initial Patient Visit    Kate Hammer is a 29 y.o. year old , referred by Mariah Campa PA-C for a 3a degree perineal laceration following vaginal delivery of baby boy on date 10/1/24.     Delivery details:   - Gestational age: 39w3d  - Delivery type:   - Infant weight: 3.38 kg  - Laceration type: 3a     Delivery/Procedure note reviewed:   28yo  s/p uncomplicated delivery of male infant after induction of labor at 39w3d. See delivery note for details. After delivery of placenta, 3a degree laceration identified and discussed with patient. Rectal examination performed to confirm depth. Anesthesia noted to be adequate. Lighting and patient positioning appropriate as well.      The ends of the EAS were identified and grasped with Allis clamps. The repair was performed in a PISA fashion using 4 interrupted sutures of 2-0 PDS. The sutures were tied down. Rectal exam again repeated and sphincter was felt to be intact.      A midline 2rd degree laceration of the perineum remained, extending approximately 5 cm proximal. This was repaired with 2-0 Monocryl in a running fashion, taking care to re-approximate the deep layers to close the dead space. The suture was continued in a running fashion until the skin of the perineum was closed.      Attention was turned to the left periurethral laceration, which was repaired in a running fashion for re-approximation and hemostasis.    Postpartum recovery:  - Vaginal pain? Some pain  - Rectal pain? After bowel movements  - For pain: tylenol/advil as needed sporadically  - For bowels: Yes, colace, stopped a couple days ago then restarted  - Urinary incontinence? Yes, UUI if she doesn't go right away  - Bowel leakage? Some flatal incontinence in first couple days, then improved  - Returned to intercourse? No  - Formula/breast feeding Breastfeeding/pumping  - Mood: doing great    PHYSICAL EXAMINATION:  Patient's last menstrual period was 2023 (approximate).   "Body mass index is 25.09 kg/m².  /85   Pulse 100   Temp 36.3 °C (97.3 °F) (Temporal)   Resp 16   Ht 1.727 m (5' 8\")   Wt 74.8 kg (165 lb)   LMP 12/30/2023 (Approximate)   SpO2 98%   Breastfeeding Yes   BMI 25.09 kg/m²     General Appearance: well appearing  Neuro: Alert and oriented   HEENT: mucous membranes moist, neck supple  Resp: No respiratory distress, normal work of breathing  MSK: normal range of motion, gait appropriate      Pelvic:  Chaperone for pelvic exam:   Genitourinary:  normal external genitalia, Bartholin's glands, [place]'s glands negative,   Urethra normal meatus, non-tender, no periurethral mass  Vaginal mucosa  normal  Cervix  not assessed  Uterus not assessed  Adnexae not assessed    Perineum: well healed laceration, well approximated, no evidence of suture bridging, no fistulous connection noted     CST negative      POP-Q (in supine position):  No significant prolapse    Rectal: no hemorrhoids, fissures or masses. Normal squeeze strength and normal resting tone. No evidence of suture transgression, wound breakdown or fistulous connection with vagina.    PVR (by ultrasound): 22 ml      IMPRESSION AND PLAN:  Kate Hammer is a 29 y.o. year old, here for evaluation for 3a perineal laceration    3a degree laceration  - we reviewed the pathology and natural history of OASI, specifically increased risk for wound complications and pelvic floor dysfunction/bowel control difficulty later in life  - reviewed purpose of ERAD clinic to closely monitor wound healing and identify issues early before they progress  - reviewed recommendations for bowel regimen, titrating to daily soft BM  - discussed sitz bath strategy (one inch of plain water in tub/cookie sheet 5 minutes TID)  - healing appropriately on exam today  - discussed plan for follow up, anticipate next visit 4 weeks and likely cleared from further evaluation at that time if no wound healing issues identified prior to then  - " reviewed warning signs: worsening pain, foul smelling discharge, enmanuel fecal incontinence, etc  -counseled on delivery planning for future deliveries and risks/benefits of vaginal versus  delivery. Reviewed if any issues with bowel control prior to next pregnancy would likely recommend  delivery.    PFPT recommendation discussed and referral placed today.     All questions and concerns were answered and addressed.  The patient expressed understanding and agrees with the plan.     Ying Bolaños MD

## 2024-11-04 ENCOUNTER — APPOINTMENT (OUTPATIENT)
Dept: OBSTETRICS AND GYNECOLOGY | Facility: CLINIC | Age: 29
End: 2024-11-04
Payer: COMMERCIAL

## 2024-11-08 ENCOUNTER — TELEPHONE (OUTPATIENT)
Dept: OBSTETRICS AND GYNECOLOGY | Facility: CLINIC | Age: 29
End: 2024-11-08
Payer: COMMERCIAL

## 2024-11-08 ENCOUNTER — OFFICE VISIT (OUTPATIENT)
Dept: OBSTETRICS AND GYNECOLOGY | Facility: CLINIC | Age: 29
End: 2024-11-08
Payer: COMMERCIAL

## 2024-11-08 VITALS
WEIGHT: 170 LBS | BODY MASS INDEX: 25.76 KG/M2 | SYSTOLIC BLOOD PRESSURE: 110 MMHG | HEIGHT: 68 IN | DIASTOLIC BLOOD PRESSURE: 60 MMHG

## 2024-11-08 DIAGNOSIS — N61.0 ACUTE MASTITIS: ICD-10-CM

## 2024-11-08 DIAGNOSIS — O13.9 GESTATIONAL HYPERTENSION, ANTEPARTUM (HHS-HCC): ICD-10-CM

## 2024-11-08 PROCEDURE — 3008F BODY MASS INDEX DOCD: CPT | Performed by: OBSTETRICS & GYNECOLOGY

## 2024-11-08 PROCEDURE — 3078F DIAST BP <80 MM HG: CPT | Performed by: OBSTETRICS & GYNECOLOGY

## 2024-11-08 PROCEDURE — 1036F TOBACCO NON-USER: CPT | Performed by: OBSTETRICS & GYNECOLOGY

## 2024-11-08 PROCEDURE — 99214 OFFICE O/P EST MOD 30 MIN: CPT | Performed by: OBSTETRICS & GYNECOLOGY

## 2024-11-08 PROCEDURE — 3074F SYST BP LT 130 MM HG: CPT | Performed by: OBSTETRICS & GYNECOLOGY

## 2024-11-08 RX ORDER — DICLOXACILLIN SODIUM 500 MG/1
500 CAPSULE ORAL 4 TIMES DAILY
Qty: 40 CAPSULE | Refills: 0 | Status: SHIPPED | OUTPATIENT
Start: 2024-11-08 | End: 2024-11-18

## 2024-11-08 ASSESSMENT — EDINBURGH POSTNATAL DEPRESSION SCALE (EPDS)
I HAVE BEEN SO UNHAPPY THAT I HAVE HAD DIFFICULTY SLEEPING: NOT AT ALL
THE THOUGHT OF HARMING MYSELF HAS OCCURRED TO ME: NEVER
I HAVE LOOKED FORWARD WITH ENJOYMENT TO THINGS: AS MUCH AS I EVER DID
I HAVE FELT SAD OR MISERABLE: NO, NOT AT ALL
TOTAL SCORE: 0
I HAVE BEEN ANXIOUS OR WORRIED FOR NO GOOD REASON: NO, NOT AT ALL
I HAVE BEEN ABLE TO LAUGH AND SEE THE FUNNY SIDE OF THINGS: AS MUCH AS I ALWAYS COULD
I HAVE FELT SCARED OR PANICKY FOR NO GOOD REASON: NO, NOT AT ALL
I HAVE BLAMED MYSELF UNNECESSARILY WHEN THINGS WENT WRONG: NO, NEVER
I HAVE BEEN SO UNHAPPY THAT I HAVE BEEN CRYING: NO, NEVER
THINGS HAVE BEEN GETTING ON TOP OF ME: NO, I HAVE BEEN COPING AS WELL AS EVER

## 2024-11-08 ASSESSMENT — PAIN SCALES - GENERAL: PAINLEVEL_OUTOF10: 0-NO PAIN

## 2024-11-08 NOTE — PROGRESS NOTES
"Assessment   IMPRESSIONS:    1. Encounter for postpartum visit (Primary)  - pap up to date  - mood doing well    2. Type 3a perineal laceration during delivery ([facility])  - suture/knot trimmed at laceration site to allow for better healing. follow up planned with ERAD clinic in 1 week    3. Gestational hypertension, antepartum ([facility])  - BP normal today, not on meds    4. Acute mastitis  - breast exam consistent with mastitis/cellulitis of skin. Recommend antibiotics. Rx sent. Continue ibuprofen and ice. Return if no improvememt.,  - dicloxacillin (Dynapen) 500 mg capsule; Take 1 capsule (500 mg) by mouth 4 times a day for 10 days.  Dispense: 40 capsule; Refill: 0      Follow up in 6-12 months or sooner TERESO Mac MD      Subjective   29 y.o.  presenting for postpartum follow-up     Delivery Date: 10/1/24  GA at Delivery: 39w3d  Type of Delivery:     Pregnancy/delivery notable for: Gestational HTN, not on meds, 3rd degree laceration    Concerns:   - concerned for blocked duct/mastitis.   - worsening breast pain starting last night ~9pm. She has noticed worsening breast redness today. No lump noted. No fevers/chills  - mood has been really good on normal dose of lexapro  - occasional twinges of pain at laceration site, still sees suture material       3/2024: ASCUS/HPV neg, repeat in 3 years  Pain: controlled  Lacerations: 3a laceration  Lochia: improving, still slight spotting   Menses: Not yet   Sexual Intimacy: not yet  Contraceptive Method: hasn't started POP yet since not planning to be sexually active for a while  Feeding Method: Breastfeeding  Lactation Consult Needed?: following with [facility] lactation consultants  Bonding with Baby: well   Mood: some worsening anxiety, desires to monitor and if no improvement may increase dose of lexapro      PHYSICAL EXAM  Objective   /60   Ht 1.727 m (5' 8\")   Wt 77.1 kg (170 lb)   LMP  (LMP Unknown)   Breastfeeding Yes   BMI 25.85 kg/m² " "    Objective   /60   Ht 1.727 m (5' 8\")   Wt 77.1 kg (170 lb)   LMP  (LMP Unknown)   Breastfeeding Yes   BMI 25.85 kg/m²      General:   Alert and oriented, in no acute distress       Breast/Axilla: Right breast with wedge shaped erythema           Vagina: Knot/suture trimmed at laceration site, small defect in mucosa overlying the knot                   Psych Normal affect. Normal mood.        "

## 2024-11-08 NOTE — TELEPHONE ENCOUNTER
Called patient left a voice mail. Dr. Mac suggest she either comes in at 1:15 or 2:15 for an appointment if not patient can take ibuprofen and just do feeds only no extra pumping it can make it worse and warm compress. If symptoms worsen in the next 24 hours to call the office.

## 2024-11-11 ENCOUNTER — APPOINTMENT (OUTPATIENT)
Dept: OBSTETRICS AND GYNECOLOGY | Facility: CLINIC | Age: 29
End: 2024-11-11

## 2024-11-14 NOTE — PROGRESS NOTES
"HISTORY OF PRESENT ILLNESS:  Kate Hammer is a 29 y.o. female, who presents in follow up for 3a perineal laceration.    During last encounter on 10/18/24, reviewed and agreed to the following:  3a degree laceration  - we reviewed the pathology and natural history of OASI, specifically increased risk for wound complications and pelvic floor dysfunction/bowel control difficulty later in life  - reviewed purpose of [facility] clinic to closely monitor wound healing and identify issues early before they progress  - reviewed recommendations for bowel regimen, titrating to daily soft BM  - discussed sitz bath strategy (one inch of plain water in tub/cookie sheet 5 minutes TID)  - healing appropriately on exam today  - discussed plan for follow up, anticipate next visit 4 weeks and likely cleared from further evaluation at that time if no wound healing issues identified prior to then  - reviewed warning signs: worsening pain, foul smelling discharge, enmanuel fecal incontinence, etc  -counseled on delivery planning for future deliveries and risks/benefits of vaginal versus  delivery. Reviewed if any issues with bowel control prior to next pregnancy would likely recommend  delivery.     PFPT recommendation discussed and referral placed today.     Today she reports   Doing well, still having some pinching sensation at site of suture trimming. Some discomfort after bowel movements or urination after bearing down, but no pain with urination or BM itself    The following were reviewed to gain additional history:  External notes: Dr. Mac note 24 PPV, doing well, knot trimmed  Test results:           PHYSICAL EXAMINATION:  No LMP recorded (lmp unknown).  Body mass index is 25.09 kg/m².  /84   Pulse 98   Temp 36.7 °C (98 °F) (Temporal)   Ht 1.727 m (5' 8\")   Wt 74.8 kg (165 lb)   LMP  (LMP Unknown)   SpO2 98%   BMI 25.09 kg/m²     General Appearance: well appearing  Neuro: Alert and oriented "   HEENT: mucous membranes moist, neck supple  Resp: No respiratory distress, normal work of breathing  MSK: normal range of motion, gait appropriate    Pelvic:  Chaperone for pelvic exam:   Genitourinary:  normal external genitalia, Bartholin's glands, [place]'s glands negative,   Urethra normal meatus, non-tender, no periurethral mass  Vaginal mucosa  normal  Atrophy negative    Perineum well healed, no residual suture material, nontender to palpation, no discharge or drainage     CST negative    POP-Q (in supine position):  No significant prolapse    Rectal: no hemorrhoids, fissures or masses.    PVR (by ultrasound):     IMPRESSION AND PLAN:  Kate Hammer is a 29 y.o. who presents in follow up for 3a laceration.    3a perineal laceration  - well healed on exam today   - cleared from all pelvic restrictions, ok to start PFPT, ok to resume intercourse   - previously counseled on delivery planning for future deliveries and risks/benefits of vaginal versus  delivery. Reviewed if any issues with bowel control prior to next pregnancy would likely recommend  delivery.  - reviewed cleared for exercising such as cardio but would hold off on core exercises    All questions and concerns were answered and addressed.  The patient expressed understanding and agrees with the plan.     RTC as needed    Ying Bolaños MD

## 2024-11-15 ENCOUNTER — OFFICE VISIT (OUTPATIENT)
Dept: OBSTETRICS AND GYNECOLOGY | Facility: CLINIC | Age: 29
End: 2024-11-15
Payer: COMMERCIAL

## 2024-11-15 VITALS
DIASTOLIC BLOOD PRESSURE: 84 MMHG | WEIGHT: 165 LBS | HEIGHT: 68 IN | TEMPERATURE: 98 F | OXYGEN SATURATION: 98 % | BODY MASS INDEX: 25.01 KG/M2 | HEART RATE: 98 BPM | SYSTOLIC BLOOD PRESSURE: 128 MMHG

## 2024-11-15 PROCEDURE — 99214 OFFICE O/P EST MOD 30 MIN: CPT | Performed by: STUDENT IN AN ORGANIZED HEALTH CARE EDUCATION/TRAINING PROGRAM

## 2024-11-15 ASSESSMENT — LIFESTYLE VARIABLES
HOW OFTEN DO YOU HAVE SIX OR MORE DRINKS ON ONE OCCASION: NEVER
HOW OFTEN DO YOU HAVE A DRINK CONTAINING ALCOHOL: NEVER

## 2024-11-15 ASSESSMENT — ENCOUNTER SYMPTOMS
OCCASIONAL FEELINGS OF UNSTEADINESS: 0
LOSS OF SENSATION IN FEET: 0
DEPRESSION: 0

## 2024-11-15 ASSESSMENT — PAIN SCALES - GENERAL: PAINLEVEL_OUTOF10: 2

## 2024-11-25 ENCOUNTER — TELEPHONE (OUTPATIENT)
Dept: LACTATION | Facility: CLINIC | Age: 29
End: 2024-11-25
Payer: COMMERCIAL

## 2024-11-26 ENCOUNTER — LACTATION CONSULT (OUTPATIENT)
Dept: LACTATION | Facility: CLINIC | Age: 29
End: 2024-11-26
Payer: COMMERCIAL

## 2024-11-26 DIAGNOSIS — O92.70 LACTATION DISORDER (HHS-HCC): Primary | ICD-10-CM

## 2024-11-26 PROCEDURE — 99211 OFF/OP EST MAY X REQ PHY/QHP: CPT | Performed by: OBSTETRICS & GYNECOLOGY

## 2024-11-26 ASSESSMENT — ENCOUNTER SYMPTOMS
OCCASIONAL FEELINGS OF UNSTEADINESS: 0
DEPRESSION: 0
LOSS OF SENSATION IN FEET: 0

## 2024-11-26 NOTE — PATIENT INSTRUCTIONS
PATIENT DISCUSSION SUMMARY:  .  .  Mother and infant seen for concerns over ongoing nipple pain and latching issues.  Mother reports that infant pops on and off and nipples have been painful since birth    Mother has been feeding on demand.   Mother's milk in and milk easily expressible.    Infant weight gain good .  Infant alert with moist mucous membranes.  Wet diaper in office.    Mother's nipples erect and breasts soft Infant able to sustain latch.  Infant retracting and losing latch intermittently, with audible slurp and loss of suction/latch.  Nipples flattened and latch painful even with deepest latch achieved    Suck assessment abnormal: reveals infant with tight maxillary frenulum, and curls upper lip, but lip able to be everted.  Infant with good cupping and extension of tongue, with posterior humping of tongue, and moderate lateralization at this exam.  Infant not noted to elevate tongue past mid mouth on this exam. Infant with periodic retraction and biting on finger, but mother not feeling biting with latch. Palate high and intact to palpation. Infant prefers to turn head to left at this exam and would not root to right.    Discussed to bottle feed with more upright positioning and catherine lips to help infant have wide latch when using bottle by pulling down on chin and everting upper lip.    Mother instructed on suck training exercises.    Pumping session observed with Eufy hands free     pump with  21    mm flanges.  Discussed increasing vacuum to comfort, use of massage and compression, and hand expression after pumping to improve breast emptying.  Mother shown technique for hand expression. Pumping out approximately   4.5 in 10   minutes.    Reviewed post birth warning signs and safe sleep.    Plan:  Mother to do suck training exercises prior to every feed.  Feed at least 8-12 times daily, both breasts for as long as infant is actively sucking and swallowing.  Use massage and compression to aid  transfer.      If infant not sustaining latch with good sucks and audible swallows, mother to pump both breasts at same time for 15-20 minutes, using massage and compression, with hand expression after to fully drain breast. Feed expressed milk to infant until satisfied.    If suck training exercises do not improve latch, infant needs to be evaluated by ENT or pediatric dentist for tight posterior frenulum, and to evaluate maxillary frenulum.    Will f/u with lactation as needed and with pediatrician.    Denies questions.  LACTATION PROBLEMS AND STRATEGIES:  Problems latching baby to breast: Baby should latch to areola (dark area) not just the nipple.  Baby's lips should be flanged outward.  Bring the baby up to the level of your breast by putting a pillow under the baby.  Dribble milk over the nipple or express milk so that baby can taste it  Encourage a deeper latch with the asymetrical latch- lining baby's nose opposite mother's nipple.  Encourage nipple to stand up prior to feeing by pumping, nipple rolling or by brief application of ice.  Gently tickle your baby's lip with your nipple to encourage your baby to open his/her mouth wide.  Hold baby so that your breast is positioned deep in the baby's mouth.  Hold your baby close, tummy to tummy.  If baby does not latch to breast, express breast milk.  If the baby is not latched on well, break seal and gently try again.  Keep baby skin to skin and watch for feeding cues.  Massage breast to start milk-ejection reflex.  Place nipple and at least 1 inch of areola into baby's mouth.  Place your hand behind the baby's neck and shoulder.  Do not force baby's head into breast.  Put baby in the football hold or clutch hold, supporting his/her neck and head in sniffing position to open his/her throat.  Shape breast into oval shape (sandwich hold)  for deep latch.  Try different feeding positions (cradle, football, side etc.).  Use a breast feeding pillow to bring baby up to  "the level of the breast.  Use semi-reclined feeding position to allow baby to take an active role and trigger baby's inborn feeding behavior.  Use your hand to support your breast during feeding.  When your baby's mouth is wide open, quickly pull baby into your breast.  Your baby's chin should be pressed into your breast.  Pumping pointers: Air dry nipples, express milk and rub in or use an approved nipple cream after expressing., Allow 10  to 15 minutes per breast for single pumping, and 10 to 15 minutes total for double pumping., Apply heat to breasts before pumping., Choose a familiar comfortable place to express milk., If nipples are sore use less pressure and pump more frequently for shorter times., Listen to a tape of baby while pumping., Look at a photo of baby wile expressing., Massage breasts before and during pumping., Minimize distractions., Moisten flange before beginning to improve seal., Relax for 5 minutes before pumping., Stimulate the nipples and hand express a few drops before pumping., and Use pumping bra/band for \"hands free\" pumping.  PATIENT INSTRUCTION HANDOUTS GIVEN:   Tips for pumping with an electric breast pump and milk storage for your healthy baby (PI# 123)  Suck training (PI# 176)  How to keep your breast pump kit clean  Foods that promote good milk production  Breastfeeding: Tips to increase your milk supply (PI# 162)  Breastfeeding: Sore and cracked nipples (PI# 167)  Breastfeeding: Plugged milk ducts/mastitis (PI# 164)  Breast massage with milk expression by hand (PI# [id number])  LACTATION EDUCATION:  Correct Positioning, Correct Latch On, and Demo use of Pump       "

## 2024-11-26 NOTE — PROGRESS NOTES
Lactation Counseling Note    Subjective:    Kate Hammer is a 29 y.o. patient referred for lactation counseling. She is here today due to Sore/cracked nipple(s), Breast pumping concerns/issues, and Latch issues. She was referred by her  self .     OB HISTORY:   Healthcare Provider: [facility]  Prenatal Screening: Negative  Maternal Blood Type: B positive  /Para: : 2  Para: 1  Weeks Gestation: 39 2/7  Mode of Delivery: Normal vaginal route  Induction/Augmentation  Epidural/General Anesthesia  3rd degree tear  Delivery Complications: None  Maternal Complications: HTN  [place] Information: Baby's Name: Vinod Hammer  : [birth year]  MRN: [id number]  Place of Birth: [facility] [place]  Healthcare Provider: [facility]  APGARS: 1 minute: 7  5 minutes: 9  Skin to skin contact: First hour  Infant's blood type: not tested  Birth parameters: AGA  Birth weight: 3380 gm  Birth length: 51 cm  Discharge weight: 3140 gm  Complications: latch issues, bleeding nipples  No other concerns    Objective:    BREASTFEEDING ASSESSMENT:   Breast Assessment: Large, Symmetrical, Full, Soft, and Compressible  Nipple Assessment/Stage: intact, erect, distorted shape or flattened, and sore Stage I - Pain or irritation with no skin breakdown  Areola: Normal  Feeding Position: cross - cradle, laid back, skin to skin, both sides, and mother needs assistance with latch/positioning  Latch: minimal assistance is needed, instructed on deep latch, shallow latch, mouth not open wide enough, latch is painful, sucking and swallowing, sucks with long jaw movement, chin and lower lip contact breast first, bursts of sucking, swallowing, and rest, upper lip turned in, lower lip turned in, flanged lips, chin moves in rhythmic motion, clenched jaws, frequent audible swallows, and nipple - slurping/pulls/nibbles  Suck/Feeding: sustained, coordinated suck/swallow/breathe, baby led rhythmically, audible swallowing, and content after feeding  Alternative Feeding Methods:  nursing at the breast, feeding expressed breast milk, and paced bottle  Feeding and Cleaning instructions reviewed for: Paced bottle  Infant Feeding Method: Breast milk only  Infant Behavior: quiet alert, readiness to feed, feeding cues observed, rooting response, and content after feeding  Infant Information: Palate- high/arch/bubble/normal  Poor occlusion of lips around areola  Tongue protrudes over alveolar ridge  Tongue humped/bunched/retracted/elevated  Good cupping of tongue  Fontanel: flat  Mucous Membranes: moist  Breast Pain: Pain scale 0-10 (10 most pain): 0  Nipple Pain: Pain scale 0-10 (10 most pain): 3  Pain description: pinch, burning  Before feeding pain 0-10 (10 most pain): 2-3  After adjustment pain 0-10 (10 most pain): 4  Other pain relief methods: nothing  No other concerns  Weight: Reported pediatrician visit weight: 3674 gm  Date of pediatrician weight: 10/16/24  Weight before feedin gm  Weight after feedin gm  Amount of breast milk transferred: 134 ml  Number of voids in the last 24 hours: 12  Number of stools in the last 24 hours: 2-3  No other concerns        PATIENT DISCUSSION SUMMARY:  .  .  Mother and infant seen for concerns over ongoing nipple pain and latching issues.  Mother reports that infant pops on and off and nipples have been painful since birth    Mother has been feeding on demand.   Mother's milk in and milk easily expressible.    Infant weight gain good .  Infant alert with moist mucous membranes.  Wet diaper in office.    Mother's nipples erect and breasts soft Infant able to sustain latch.  Infant retracting and losing latch intermittently, with audible slurp and loss of suction/latch.  Nipples flattened and latch painful even with deepest latch achieved    Suck assessment abnormal: reveals infant with tight maxillary frenulum, and curls upper lip, but lip able to be everted.  Infant with good cupping and extension of tongue, with posterior humping of tongue,  and moderate lateralization at this exam.  Infant not noted to elevate tongue past mid mouth on this exam. Infant with periodic retraction and biting on finger, but mother not feeling biting with latch. Palate high and intact to palpation. Infant prefers to turn head to left at this exam and would not root to right.    Discussed to bottle feed with more upright positioning and catherine lips to help infant have wide latch when using bottle by pulling down on chin and everting upper lip.    Mother instructed on suck training exercises.    Pumping session observed with Eufy hands free     pump with  21    mm flanges.  Discussed increasing vacuum to comfort, use of massage and compression, and hand expression after pumping to improve breast emptying.  Mother shown technique for hand expression. Pumping out approximately   4.5 oz in 10   minutes.    Reviewed post birth warning signs and safe sleep.    Plan:  Mother to do suck training exercises prior to every feed.  Feed at least 8-12 times daily, both breasts for as long as infant is actively sucking and swallowing.  Use massage and compression to aid transfer.      If infant not sustaining latch with good sucks and audible swallows, mother to pump both breasts at same time for 15-20 minutes, using massage and compression, with hand expression after to fully drain breast. Feed expressed milk to infant until satisfied.    If suck training exercises do not improve latch, infant needs to be evaluated by ENT or pediatric dentist for tight posterior frenulum, and to evaluate maxillary frenulum.    Will f/u with lactation as needed and with pediatrician.    Denies questions.  LACTATION PROBLEMS AND STRATEGIES:  Problems latching baby to breast: Baby should latch to areola (dark area) not just the nipple.  Baby's lips should be flanged outward.  Bring the baby up to the level of your breast by putting a pillow under the baby.  Dribble milk over the nipple or express milk so that  baby can taste it  Encourage a deeper latch with the asymetrical latch- lining baby's nose opposite mother's nipple.  Encourage nipple to stand up prior to feeing by pumping, nipple rolling or by brief application of ice.  Gently tickle your baby's lip with your nipple to encourage your baby to open his/her mouth wide.  Hold baby so that your breast is positioned deep in the baby's mouth.  Hold your baby close, tummy to tummy.  If baby does not latch to breast, express breast milk.  If the baby is not latched on well, break seal and gently try again.  Keep baby skin to skin and watch for feeding cues.  Massage breast to start milk-ejection reflex.  Place nipple and at least 1 inch of areola into baby's mouth.  Place your hand behind the baby's neck and shoulder.  Do not force baby's head into breast.  Put baby in the football hold or clutch hold, supporting his/her neck and head in sniffing position to open his/her throat.  Shape breast into oval shape (sandwich hold)  for deep latch.  Try different feeding positions (cradle, football, side etc.).  Use a breast feeding pillow to bring baby up to the level of the breast.  Use semi-reclined feeding position to allow baby to take an active role and trigger baby's inborn feeding behavior.  Use your hand to support your breast during feeding.  When your baby's mouth is wide open, quickly pull baby into your breast.  Your baby's chin should be pressed into your breast.  Pumping pointers: Air dry nipples, express milk and rub in or use an approved nipple cream after expressing., Allow 10  to 15 minutes per breast for single pumping, and 10 to 15 minutes total for double pumping., Apply heat to breasts before pumping., Choose a familiar comfortable place to express milk., If nipples are sore use less pressure and pump more frequently for shorter times., Listen to a tape of baby while pumping., Look at a photo of baby wile expressing., Massage breasts before and during  "pumping., Minimize distractions., Moisten flange before beginning to improve seal., Relax for 5 minutes before pumping., Stimulate the nipples and hand express a few drops before pumping., and Use pumping bra/band for \"hands free\" pumping.  PATIENT INSTRUCTION HANDOUTS GIVEN:   Tips for pumping with an electric breast pump and milk storage for your healthy baby (PI# 123)  Suck training (PI# 176)  How to keep your breast pump kit clean  Foods that promote good milk production  Breastfeeding: Tips to increase your milk supply (PI# 162)  Breastfeeding: Sore and cracked nipples (PI# 167)  Breastfeeding: Plugged milk ducts/mastitis (PI# 164)  Breast massage with milk expression by hand (PI# [id number])  LACTATION EDUCATION:  Correct Positioning, Correct Latch On, and Demo use of Pump       "

## 2024-11-27 NOTE — LACTATION NOTE
Follow up phone call for lactation visit. Left message for patient to call IBCLC at [phone] to discuss progress or concerns.

## 2024-12-02 ENCOUNTER — TELEPHONE (OUTPATIENT)
Dept: LACTATION | Facility: CLINIC | Age: 29
End: 2024-12-02
Payer: COMMERCIAL

## 2024-12-02 ENCOUNTER — EVALUATION (OUTPATIENT)
Dept: PHYSICAL THERAPY | Facility: CLINIC | Age: 29
End: 2024-12-02
Payer: COMMERCIAL

## 2024-12-02 DIAGNOSIS — M62.89 MUSCLE TIGHTNESS: ICD-10-CM

## 2024-12-02 DIAGNOSIS — M62.81 MUSCLE WEAKNESS: ICD-10-CM

## 2024-12-02 DIAGNOSIS — M62.89 PELVIC FLOOR DYSFUNCTION IN FEMALE: Primary | ICD-10-CM

## 2024-12-02 DIAGNOSIS — M79.9 SOFT TISSUE COMPLAINT: ICD-10-CM

## 2024-12-02 PROCEDURE — 97162 PT EVAL MOD COMPLEX 30 MIN: CPT | Mod: GP | Performed by: PHYSICAL THERAPIST

## 2024-12-02 PROCEDURE — 97535 SELF CARE MNGMENT TRAINING: CPT | Mod: GP | Performed by: PHYSICAL THERAPIST

## 2024-12-02 NOTE — PROGRESS NOTES
Physical Therapy  Physical Therapy Pelvic Floor Evaluation    Name: Kate Hammer  MRN: [id number]  : 1995  Encounter Date: 2024  Visit Count: 1     Time Calculation  Start Time: 1430  Stop Time: 1530  Time Calculation (min): 60 min    Insurance: Nando, chai simpson  Visit # 1 of 90 for     Current Problem:  1. Pelvic floor dysfunction in female        2. Third degree laceration of perineum, type 3a ([facility]HS-HCC)  Referral to Physical Therapy      3. Muscle tightness        4. Muscle weakness        5. Soft tissue complaint            General  Reason for Referral: 3a perineal laceration  Referred By: Mami RENDON Fall Risk Score (The score of 4 or more indicates an increased risk of falling): 0  Precautions Comment: no pelvic restrictions  Vital Signs     Pain       Subjective  Chief Complaint: 9 weeks post-partum  11/15/24:  - no pelvic restrictions  - occasional pain between vagina and rectum - tightness  Onset: 10/1/24  CHIKIS: 3a perineal laceration    Current Condition: better    PAIN  Intensity (0-10): 0-3  Location: perineum  Description: pressure, sore, tightness    Aggravating Factors: standing, moving  Relieving Factors: ice packs, witch-jesusita pads, derma-plast spray    Relevant Information (PMH & Previous Tests/Imaging): see chart  Previous Interventions/Treatments: see chart    Prior Level of Function (PLOF)  Exercise/Physical Activity:   Now: walking  Goal: longer walks (couple of miles 1-3), core workouts  Work/School: RN - off of work    Treatment Goals: support PFPT with recovery and ADLs/activities & life    Cultural or Spiritual Practices: no  History of Trauma: no  Safe at Home: yes    OB/GYN HISTORY:   Pregnancies (): 2   Births (Para): 1  Vaginal = 1   Episiotomy, Forceps, or Suction = no  Difficult Labor? 1 hour, 20secs  Prolapse? No    Painful [place] or vaginal penetration? No - have not attempted since delivery    BLADDER  Frequency of  Urination  Daytime: every 1-2 hours  Nighttime: urination when awake with babe  Screening = Recurrent infections/UTIs? No  Other Symptoms   Trouble initiating urine stream   X Urine stream is intermittent or slow   X Trouble emptying bladder completely - sometimes    Dribbling after urination   X Straining or pushing to empty    Trouble feeling bladder urge/fullness   X Trouble holding urine when you get an urge - much improved   Urinary Incontinence/Leakage  Frequency (day/week/month)? No  Present with cough and/or sneeze? No  Do you wear any barriers, such as pantishields or pads? No  Average Fluid Intake (glasses/day): Skyler [id number]    BOWEL  Frequency of Bowel Movements: regular, 1x/day  Stool Form: [place] Scale = a little softer  Management Techniques: Colace  Bowel Incontinence/Leakage? No  Average Fiber Intake (per day): No      Objective  Patient was educated on pelvic floor anatomy, function, and dysfunction.   Patient was educated on an orthopedic assessment & external pelvic floor assessment technique and verbalized consent.   The patient is aware they have full autonomy and can stop the assessment at any time.     Standing Assessment:   Posture: mild increased ant pelvic tilt  SL Stance: hip drop and increased sway bilaterally  DL Squat: dkv  Standing Lumbar Mobility: wnl    Supine Movement Assessment   SLR: mild rocking and doming  Bridge: lumbar extension  Hip PROM: hypermobility  MMT: sup hip: 4/5 bilaterally    Supine Mobility Assessment  - Hamstrings: mild end range tightness  - Piriformis: wnl    Diaphragmatic Breathing: fair  Rib Palpation/Positioning: flared  Assess Abdomen  Diastasis Recti: negative  Nicko + Head Lift: functional closure, mild separation with fatigue    OUTCOMES MEASURE  Emir Pelvic Dysfunction Screening Tool : negative, 1    NIH-CPSI  -Pain: 8  - Urinary Symp: 4  - QOL: 6    Goals:   Active       PT Problem       PT Goal 1       Start:  12/02/24    Expected End:  03/02/25        Patient will return to prior level of function with minimal to no pain and without limitations for participation in ADLs, health, and exercise.   Patient demonstrate home exercise program adherence to supplement symptom reduction and functional gains made in clinic  Patient will reports minimal to no pain for participation in ADLs and return to PLOF.   Patient will demonstrate coordination of pelvic floor, strength & relaxation wnl for return to ADLs and PLOF without restriction.   Pt will demonstrate improvement on the outcome measure score to demonstrate overall improved functional abilities and quality of life.              Education: home exercise program, plan of care, activity modifications, pain management, and injury pathology  Bladder Habits: Just in Case Pee, Hover over the toilet, relax & breathe, squatty potty use  Hydration Recommendation: 50% of your body wt (pounds) in fluid ounces  Bladder Irritants: caffeine, artificial sweeteners, carbonated beverages, alcohol  Fiber Intake Recommendation: 25-30g/day    Treatments:   - education: home exercise program, plan of care, activity modifications, pain management, and injury pathology  - desensitization: vulva: external  - perineal massage with fingers or pelvic wand    Access Code: [email or web address]  STRETCH  - Supine Pelvic Floor Stretch  - 3-5 x weekly - 3 sets - 5 breaths hold  - Cat Cow  - 3-5 x weekly - 10 reps  - Child's Pose Stretch  - 3-5 x weekly - 1-3 minutes hold  - Lying Prone  - 3-5 x weekly - 5-10 minutes hold    STRENGTH  - Supine Pelvic Tilt  - 3-5 x weekly - 10 reps  - Hooklying Transversus Abdominis Palpation  - 3-5 x weekly - 5-10 reps - 2 breaths hold  - Supine Hip Adduction Isometric with Ball  - 3-5 x weekly - 3 sets - 10 reps  - Hooklying Clamshell with Resistance  - 3-5 x weekly - 3 sets - 10 reps  - Bridge  - 3-5 x weekly - 3 sets - 10 reps    Plan for Next Visit:   - assess response to hep  - progress core/hip/functional strength as  able/tolerated  - TrA activation and Nicko re-check    Assessment: Patient presents with signs and symptoms consistent with post-partum recovery following 3a perineal laceration due to vaginal delivery, resulting in pelvic floor dysfunction, muscle tightness, soft tissue compliant, muscle weakness, resulting in limited participation in pain-free ADLs and inability to perform at their prior level of function. Pt would benefit from physical therapy to address the impairments found & listed previously in the objective section in order to return to safe and pain-free ADLs and prior level of function.    Plan:   Planned Interventions include: therapeutic exercise, self-care home management, manual therapy, therapeutic activities, gait training, neuromuscular coordination, aquatic therapy  Patient and/or family understands and agrees with goals and plan documented: yes  Frequency: 2x/month  Duration: 2-4 months     Candy Gonzalez, PT

## 2024-12-02 NOTE — LACTATION NOTE
Follow up phone call for lactation visit. Per patient latch still uncomfortable, has been doing suck training.  Plans additional evaluation. Follow up lactation appt schedule 12/6/24

## 2024-12-04 ENCOUNTER — LACTATION CONSULT (OUTPATIENT)
Dept: LACTATION | Facility: CLINIC | Age: 29
End: 2024-12-04
Payer: COMMERCIAL

## 2024-12-04 DIAGNOSIS — O92.70 LACTATION DISORDER (HHS-HCC): Primary | ICD-10-CM

## 2024-12-04 PROCEDURE — 99211 OFF/OP EST MAY X REQ PHY/QHP: CPT | Performed by: OBSTETRICS & GYNECOLOGY

## 2024-12-04 ASSESSMENT — ENCOUNTER SYMPTOMS
OCCASIONAL FEELINGS OF UNSTEADINESS: 0
DEPRESSION: 0

## 2024-12-04 NOTE — PROGRESS NOTES
Lactation Counseling Note    Subjective:    Kate Hammer is a 29 y.o. patient referred for lactation counseling. She is here today due to Sore/cracked nipple(s), Breast pumping concerns/issues, Latch issues, and s/p lingual and maxillary frenectomy . She was referred by her  self .     OB HISTORY:   Healthcare Provider: [facility]  [place] Information: Baby's Name: Vinod Hammer  : 10/1/[birth year]  MRN: [id number]  Healthcare Provider: Wilver  Birth weight: 3380 gm    Objective:    BREASTFEEDING ASSESSMENT:   Breast Assessment: Large, Symmetrical, Full, Soft, and Compressible  Nipple Assessment/Stage: intact, erect, distorted shape or flattened, and sore Stage I - Pain or irritation with no skin breakdown  Areola: Normal  Feeding Position: breast sandwich, cross - cradle, skin to skin, both sides, nipple to nose, mother demonstrates good positioning, mother needs assistance with latch/positioning, and baby's head too high over breast  Latch: minimal assistance is needed, instructed on deep latch, eagerly grasped on to latch, areolar attachment, deep latch obtained, optimal angle of mouth opening, latch is painful, sucking and swallowing, sucks with long jaw movement, chin and lower lip contact breast first, bursts of sucking, swallowing, and rest, upper lip turned in, lower lip turned in, flanged lips, chin moves in rhythmic motion, and frequent audible swallows  Suck/Feeding: sustained, coordinated suck/swallow/breathe, baby led rhythmically, and audible swallowing  Alternative Feeding Methods: nursing at the breast, feeding expressed breast milk, and paced bottle  Feeding and Cleaning instructions reviewed for: Paced bottle  Infant Feeding Method: Breast milk only  Infant Behavior: awake, fussy, readiness to feed, feeding cues observed, and rooting response  Infant Information: Ankyloglossia  Post status frenotomy  Palate- high/arch/bubble/normal  Tongue protrudes over alveolar ridge  Good cupping of tongue  Good  lateral movement of the tongue  Able to elevate tongue to roof of mouth  Fontanel: flat  Mucous Membranes: moist  Breast Pain: Pain scale 0-10 (10 most pain): 0  Pain description: tightness by clogged duct  Before feeding pain 0-10 (10 most pain): 0  After adjustment pain 0-10 (10 most pain): 0  Other pain relief methods: cold, NSAIDS, therapeutic massage  Nipple Pain: Pain scale 0-10 (10 most pain): 2  Pain description: tightness  Before feeding pain 0-10 (10 most pain): 2  After adjustment pain 0-10 (10 most pain): 0  Other pain relief methods: nothing  Nothing else  Weight: Reported pediatrician visit weight: 5880 gm  Date of pediatrician weight: 24  Weight before feedin gm  Weight after feedin gm  Amount of breast milk transferred: 82 ml  Number of voids in the last 24 hours: 10-12  Number of stools in the last 24 hours: 4  No other concerns        PATIENT DISCUSSION SUMMARY:  .  .  Mother and infant seen s/p lingual and maxillary frenectomy  12/3/24     .      Infant was fussy s/p immunizations earlier today but able to sustain latch on both breasts in cross cradle .  Mother reports latch as more comfortable and nipple more rounded after feeds. Mother reports about 80% reduction in latch pain since frenectomy.    Mother has firm area in left breast at 12 o'clock position for 2 days. No fever , chills redness or malaise. Mother has been treating with cold packs, ibuprofen and therapeutic massage. Discussed symptoms of mastitis and when to call MD    Suck assessment reveals infant with improved extension, elevation and lateralization.  Cupping remains good.  No humping of tongue noted, and infant with wider latch and improved flange of both upper and lower lips.    Infant still fatigues during feeding and alternates between nutritive sucking and non-nutritive fluttery sucking.  Discussed with mother that suck will improve over time as tongue strengthens and infant learns to use previously  unused muscles.  Encouraged as much feeding at the breast as possible.      Pumping observed with Spectra S1 with 19 mm flanges.  Mother pumped 4 ounces in 10 minutes    Reviewed post birth warning signs and safe sleep    Plan:  Mother to continue suck training exercises prior to feeds, and stretching exercises as recommended by frenotomy provider.  Mother to feed infant on demand at least 8-12 times daily, preferably skin to skin.  Mother to use breast massage and compression during feeding, to facilitate milk transfer.  Mother to offer expressed milk if  infant still hungry.      May pump 1-2 times daily to offer daily bottle of pumped milk.    To follow up with pediatrician 2/5/24, and with lactation as desires.    Denies questions  LACTATION PROBLEMS AND STRATEGIES:  Problems latching baby to breast: Baby should latch to areola (dark area) not just the nipple.  Baby's lips should be flanged outward.  Bring the baby up to the level of your breast by putting a pillow under the baby.  Dribble milk over the nipple or express milk so that baby can taste it  Encourage a deeper latch with the asymetrical latch- lining baby's nose opposite mother's nipple.  Gently tickle your baby's lip with your nipple to encourage your baby to open his/her mouth wide.  Hold baby so that your breast is positioned deep in the baby's mouth.  Hold your baby close, tummy to tummy.  If baby does not latch to breast, express breast milk.  If the baby is not latched on well, break seal and gently try again.  Keep baby skin to skin and watch for feeding cues.  Massage breast to start milk-ejection reflex.  Place nipple and at least 1 inch of areola into baby's mouth.  Place your hand behind the baby's neck and shoulder.  Do not force baby's head into breast.  Put baby in the football hold or clutch hold, supporting his/her neck and head in sniffing position to open his/her throat.  Shape breast into oval shape (sandwich hold)  for deep  "latch.  Try different feeding positions (cradle, football, side etc.).  Use a breast feeding pillow to bring baby up to the level of the breast.  Use semi-reclined feeding position to allow baby to take an active role and trigger baby's inborn feeding behavior.  Use your hand to support your breast during feeding.  When your baby's mouth is wide open, quickly pull baby into your breast.  Your baby's chin should be pressed into your breast.  Pumping pointers: Air dry nipples, express milk and rub in or use an approved nipple cream after expressing., Apply heat to breasts before pumping., Choose a familiar comfortable place to express milk., If nipples are sore use less pressure and pump more frequently for shorter times., Listen to a tape of baby while pumping., Look at a photo of baby wile expressing., Massage breasts before and during pumping., Minimize distractions., Moisten flange before beginning to improve seal., Pick a good time of day to begin (early in a.m., during the baby's long sleep stretch, when skipping a feeding, etc.)., Relax for 5 minutes before pumping., Stimulate the nipples and hand express a few drops before pumping., and Use pumping bra/band for \"hands free\" pumping.  PATIENT INSTRUCTION HANDOUTS GIVEN:   none  LACTATION EDUCATION:  Correct Positioning and Correct Latch On       "

## 2024-12-04 NOTE — PATIENT INSTRUCTIONS
PATIENT DISCUSSION SUMMARY:  .  .  Mother and infant seen s/p lingual and maxillary frenectomy  12/3/24     .      Infant was fussy s/p immunizations earlier today but able to sustain latch on both breasts in cross cradle .  Mother reports latch as more comfortable and nipple more rounded after feeds. Mother reports about 80% reduction in latch pain since frenectomy.    Mother has firm area in left breast at 12 o'clock position for 2 days. No fever , chills redness or malaise. Mother has been treating with cold packs, ibuprofen and therapeutic massage. Discussed symptoms of mastitis and when to call MD    Suck assessment reveals infant with improved extension, elevation and lateralization.  Cupping remains good.  No humping of tongue noted, and infant with wider latch and improved flange of both upper and lower lips.    Infant still fatigues during feeding and alternates between nutritive sucking and non-nutritive fluttery sucking.  Discussed with mother that suck will improve over time as tongue strengthens and infant learns to use previously unused muscles.  Encouraged as much feeding at the breast as possible.      Pumping observed with Spectra S1 with 19 mm flanges.  Mother pumped 4 ounces in 10 minutes    Reviewed post birth warning signs and safe sleep    Plan:  Mother to continue suck training exercises prior to feeds, and stretching exercises as recommended by frenotomy provider.  Mother to feed infant on demand at least 8-12 times daily, preferably skin to skin.  Mother to use breast massage and compression during feeding, to facilitate milk transfer.  Mother to offer expressed milk if  infant still hungry.      May pump 1-2 times daily to offer daily bottle of pumped milk.    To follow up with pediatrician 2/5/24, and with lactation as desires.    Denies questions  LACTATION PROBLEMS AND STRATEGIES:  Problems latching baby to breast: Baby should latch to areola (dark area) not just the nipple.  Baby's lips  should be flanged outward.  Bring the baby up to the level of your breast by putting a pillow under the baby.  Dribble milk over the nipple or express milk so that baby can taste it  Encourage a deeper latch with the asymetrical latch- lining baby's nose opposite mother's nipple.  Gently tickle your baby's lip with your nipple to encourage your baby to open his/her mouth wide.  Hold baby so that your breast is positioned deep in the baby's mouth.  Hold your baby close, tummy to tummy.  If baby does not latch to breast, express breast milk.  If the baby is not latched on well, break seal and gently try again.  Keep baby skin to skin and watch for feeding cues.  Massage breast to start milk-ejection reflex.  Place nipple and at least 1 inch of areola into baby's mouth.  Place your hand behind the baby's neck and shoulder.  Do not force baby's head into breast.  Put baby in the football hold or clutch hold, supporting his/her neck and head in sniffing position to open his/her throat.  Shape breast into oval shape (sandwich hold)  for deep latch.  Try different feeding positions (cradle, football, side etc.).  Use a breast feeding pillow to bring baby up to the level of the breast.  Use semi-reclined feeding position to allow baby to take an active role and trigger baby's inborn feeding behavior.  Use your hand to support your breast during feeding.  When your baby's mouth is wide open, quickly pull baby into your breast.  Your baby's chin should be pressed into your breast.  Pumping pointers: Air dry nipples, express milk and rub in or use an approved nipple cream after expressing., Apply heat to breasts before pumping., Choose a familiar comfortable place to express milk., If nipples are sore use less pressure and pump more frequently for shorter times., Listen to a tape of baby while pumping., Look at a photo of baby wile expressing., Massage breasts before and during pumping., Minimize distractions., Moisten flange  "before beginning to improve seal., Pick a good time of day to begin (early in a.m., during the baby's long sleep stretch, when skipping a feeding, etc.)., Relax for 5 minutes before pumping., Stimulate the nipples and hand express a few drops before pumping., and Use pumping bra/band for \"hands free\" pumping.  PATIENT INSTRUCTION HANDOUTS GIVEN:   none  LACTATION EDUCATION:  Correct Positioning and Correct Latch On       "

## 2024-12-05 ENCOUNTER — TELEPHONE (OUTPATIENT)
Dept: OBSTETRICS AND GYNECOLOGY | Facility: CLINIC | Age: 29
End: 2024-12-05
Payer: COMMERCIAL

## 2024-12-05 NOTE — TELEPHONE ENCOUNTER
Called patient. Patient sent a [employer] message stating:   Hi Dr. Mac,      I woke up with a clogged duct on my left side Monday morning. I saw my lactation consultant yesterday and she encouraged me to reach out to you. I have tried everything to relieve it but it is not going away. I am taking ibuprofen, icing, and light massage. Initially it was painful but not so much any more. It has definitely gone down but is still there. I don’t have any fever, chills, or redness but worried it could progress to mastitis. Wondering if there is anything else I can do for it?      Thanks,  Kate Mac since it is getting better to continue to with mortin and ice no massaging. Patient would like to be seen for an appointment to follow up since this has been going on since Monday. Patient scheduled for an appointment.

## 2024-12-06 ENCOUNTER — APPOINTMENT (OUTPATIENT)
Dept: LACTATION | Facility: CLINIC | Age: 29
End: 2024-12-06
Payer: COMMERCIAL

## 2024-12-06 ENCOUNTER — OFFICE VISIT (OUTPATIENT)
Dept: OBSTETRICS AND GYNECOLOGY | Facility: CLINIC | Age: 29
End: 2024-12-06
Payer: COMMERCIAL

## 2024-12-06 ENCOUNTER — TELEPHONE (OUTPATIENT)
Dept: LACTATION | Facility: CLINIC | Age: 29
End: 2024-12-06

## 2024-12-06 VITALS — DIASTOLIC BLOOD PRESSURE: 78 MMHG | SYSTOLIC BLOOD PRESSURE: 102 MMHG

## 2024-12-06 DIAGNOSIS — O92.79 CLOGGED DUCT, POSTPARTUM (HHS-HCC): Primary | ICD-10-CM

## 2024-12-06 PROCEDURE — 99213 OFFICE O/P EST LOW 20 MIN: CPT | Performed by: OBSTETRICS & GYNECOLOGY

## 2024-12-06 PROCEDURE — 3074F SYST BP LT 130 MM HG: CPT | Performed by: OBSTETRICS & GYNECOLOGY

## 2024-12-06 PROCEDURE — 3078F DIAST BP <80 MM HG: CPT | Performed by: OBSTETRICS & GYNECOLOGY

## 2024-12-06 NOTE — PROGRESS NOTES
Follow up phone call for lactation visit. Feels like breastfeeding going well. Saw MD today for plugged duct, will continue with cold, ibuprofen and tylenol and decrease pumping unless too uncomfortable. Latch pain is almost gone now, but is still having breast discomfort from plugged duct.  Scheduled appt for 12/12/24 to reevaluate latch and transfer.

## 2024-12-06 NOTE — PROGRESS NOTES
Assessment   IMPRESSIONS:    1. Clogged duct, postpartum ([facility]) (Primary)  - recommend ice, ibuprofen, physiologic feeding  - recommend reducing oversupply to prevent recurring blocked ducts/mastitis  - recommend discontinuation of pumping. Can hand express small amounts to comfort if needed  - consider breast imaging if lump still palpable at [facility]  - follow up in 2 weeks or sooner TERESO Mac MD      Subjective   29 y.o.  presenting for possible blocked duct    Concerns:   - started 5 days ago, always worse on left   - 4 days of ice, ibuprofen, symptoms are feeling better  - lump has reduced in size and less painful  - baby had tongue tie release at [facility] dentist leonie  - pumping extra milk each morning in addition to direct feeding the baby      PHYSICAL EXAM    Objective   /78 (BP Location: Right arm, Patient Position: Sitting)   LMP  (LMP Unknown)      General:   Alert and oriented, in no acute distress       Breast/Axilla: Left breast with large indurated area in upper outer quadrant, minimal erythema

## 2024-12-12 ENCOUNTER — LACTATION CONSULT (OUTPATIENT)
Dept: LACTATION | Facility: CLINIC | Age: 29
End: 2024-12-12
Payer: COMMERCIAL

## 2024-12-12 DIAGNOSIS — O92.70 LACTATION DISORDER (HHS-HCC): Primary | ICD-10-CM

## 2024-12-12 PROCEDURE — 99211 OFF/OP EST MAY X REQ PHY/QHP: CPT | Performed by: OBSTETRICS & GYNECOLOGY

## 2024-12-12 ASSESSMENT — ENCOUNTER SYMPTOMS
DEPRESSION: 0
LOSS OF SENSATION IN FEET: 0
OCCASIONAL FEELINGS OF UNSTEADINESS: 0

## 2024-12-12 NOTE — PATIENT INSTRUCTIONS
PATIENT DISCUSSION SUMMARY:  .  .  Mother and infant seen s/p lingual and maxillary frenectomy  12/3/24.       Mother reports latch as more comfortable and nipple more rounded after feeds. Mother reports about 80% reduction in latch pain since frenectomy.    Suck assessment reveals infant with improved extension, elevation and lateralization.  Cupping remains good.  No humping of tongue noted, and infant with wider latch and improved flange of both upper and lower lips.  Mother has noted infant turning more to left at home. Preferring to turn left in office, but does turn to right. Discussed encouraging infat to look both ways with bright toys and to speak with pediatrician if continues to look more to one side.     Infant doing much more active feeding and with less non-nutritive fluttery sucking.  Discussed with mother that suck will improve over time as tongue strengthens and infant learns to use previously unused muscles.  Encouraged as much feeding at the breast as possible.          Reviewed post birth warning signs and safe sleep     Plan:  Mother to continue suck training exercises prior to feeds, and stretching exercises as recommended by frenotomy provider.  Mother to feed infant on demand at least 8-12 times daily, preferably skin to skin.  Mother to use breast massage and compression during feeding, to facilitate milk transfer.  Mother to offer expressed milk if  infant still hungry.       May pump once daily to offer daily bottle of pumped milk.     To follow up with pediatrician 2/5/24, and with lactation as desires.     Denies questions  LACTATION PROBLEMS AND STRATEGIES:  Problems latching baby to breast: Baby should latch to areola (dark area) not just the nipple.  Baby's lips should be flanged outward.  Bring the baby up to the level of your breast by putting a pillow under the baby.  Encourage a deeper latch with the asymetrical latch- lining baby's nose opposite mother's nipple.  Encourage nipple  to stand up prior to feeing by pumping, nipple rolling or by brief application of ice.  Hold baby so that your breast is positioned deep in the baby's mouth.  Hold your baby close, tummy to tummy.  If baby does not latch to breast, express breast milk.  If the baby is not latched on well, break seal and gently try again.  Keep baby skin to skin and watch for feeding cues.  Massage breast to start milk-ejection reflex.  Place nipple and at least 1 inch of areola into baby's mouth.  Place your hand behind the baby's neck and shoulder.  Do not force baby's head into breast.  Put baby in the football hold or clutch hold, supporting his/her neck and head in sniffing position to open his/her throat.  Shape breast into oval shape (sandwich hold)  for deep latch.  Try different feeding positions (cradle, football, side etc.).  Use a breast feeding pillow to bring baby up to the level of the breast.  Use semi-reclined feeding position to allow baby to take an active role and trigger baby's inborn feeding behavior.  Use your hand to support your breast during feeding.  When your baby's mouth is wide open, quickly pull baby into your breast.  Your baby's chin should be pressed into your breast.  Pumping pointers: Air dry nipples, express milk and rub in or use an approved nipple cream after expressing., Apply heat to breasts before pumping., Choose a familiar comfortable place to express milk., If nipples are sore use less pressure and pump more frequently for shorter times., Listen to a tape of baby while pumping., Look at a photo of baby wile expressing., Massage breasts before and during pumping., Minimize distractions., Moisten flange before beginning to improve seal., Pick a good time of day to begin (early in a.m., during the baby's long sleep stretch, when skipping a feeding, etc.)., Relax for 5 minutes before pumping., Stimulate the nipples and hand express a few drops before pumping., and Use pumping bra/band for  "\"hands free\" pumping.  PATIENT INSTRUCTION HANDOUTS GIVEN:   none  LACTATION EDUCATION:  Correct Positioning       "

## 2024-12-12 NOTE — PROGRESS NOTES
Lactation Counseling Note    Subjective:    Kate Hammer is a 29 y.o. patient referred for lactation counseling. She is here today due to Latch issues and s/p lingual and maxillary frenectomy . She was referred by her  self .     OB HISTORY:   Healthcare Provider: [facility]   Information: Baby's Name: Vinod Hammer  : 10/[birth year]  Healthcare Provider: [facility]  Birth weight: 3380 gm    Objective:    BREASTFEEDING ASSESSMENT:   Breast Assessment: Large, Symmetrical, Soft, and Compressible  Nipple Assessment/Stage: intact, erect, distorted shape or flattened, and sore Stage I - Pain or irritation with no skin breakdown  Areola: Normal  Feeding Position: cross - cradle, football/seated, skin to skin, both sides, and mother demonstrates good positioning  Latch: instructed on deep latch, eagerly grasped on to latch, deep latch obtained, latch is painful, pain during feeding, sucking and swallowing, sucks with long jaw movement, chin and lower lip contact breast first, flanged lips, chin moves in rhythmic motion, and frequent audible swallows  Suck/Feeding: sustained, coordinated suck/swallow/breathe, baby led rhythmically, and audible swallowing  Alternative Feeding Methods: nursing at the breast, feeding expressed breast milk, and paced bottle  Feeding and Cleaning instructions reviewed for: Paced bottle  Infant Feeding Method: Breast milk only  Infant Behavior: awake, active alert, readiness to feed, feeding cues observed, and sucking  Infant Information: Ankyloglossia  Post status frenotomy  Palate- high/arch/bubble/normal  Tongue protrudes over alveolar ridge  Good cupping of tongue  Good lateral movement of the tongue  Able to elevate tongue to roof of mouth  Fontanel: flat  Mucous Membranes: moist  Breast Pain: Pain scale 0-10 (10 most pain): 0  Nipple Pain: Pain scale 0-10 (10 most pain): 2  Pain description: tingling pain  Before feeding pain 0-10 (10 most pain): 2  After adjustment pain 0-10 (10 most  pain): 2  Other pain relief methods: nothing  No other concerns  Weight: Reported pediatrician visit weight: 5880 gm  Date of pediatrician weight: 24  Weight before feedin gm  Weight after feedin gm  Amount of breast milk transferred: 132 ml  Number of voids in the last 24 hours: 10-12  Number of stools in the last 24 hours: 4  No other concerns        PATIENT DISCUSSION SUMMARY:  .  .  Mother and infant seen s/p lingual and maxillary frenectomy  12/3/24.       Mother reports latch as more comfortable and nipple more rounded after feeds. Mother reports about 80% reduction in latch pain since frenectomy.    Suck assessment reveals infant with improved extension, elevation and lateralization.  Cupping remains good.  No humping of tongue noted, and infant with wider latch and improved flange of both upper and lower lips.  Mother has noted infant turning more to left at home. Preferring to turn left in office, but does turn to right. Discussed encouraging infat to look both ways with bright toys and to speak with pediatrician if continues to look more to one side.     Infant doing much more active feeding and with less non-nutritive fluttery sucking.  Discussed with mother that suck will improve over time as tongue strengthens and infant learns to use previously unused muscles.  Encouraged as much feeding at the breast as possible.          Reviewed post birth warning signs and safe sleep     Plan:  Mother to continue suck training exercises prior to feeds, and stretching exercises as recommended by frenotomy provider.  Mother to feed infant on demand at least 8-12 times daily, preferably skin to skin.  Mother to use breast massage and compression during feeding, to facilitate milk transfer.  Mother to offer expressed milk if  infant still hungry.       May pump once daily to offer daily bottle of pumped milk.     To follow up with pediatrician 24, and with lactation as desires.     Denies  questions  LACTATION PROBLEMS AND STRATEGIES:  Problems latching baby to breast: Baby should latch to areola (dark area) not just the nipple.  Baby's lips should be flanged outward.  Bring the baby up to the level of your breast by putting a pillow under the baby.  Encourage a deeper latch with the asymetrical latch- lining baby's nose opposite mother's nipple.  Encourage nipple to stand up prior to feeing by pumping, nipple rolling or by brief application of ice.  Hold baby so that your breast is positioned deep in the baby's mouth.  Hold your baby close, tummy to tummy.  If baby does not latch to breast, express breast milk.  If the baby is not latched on well, break seal and gently try again.  Keep baby skin to skin and watch for feeding cues.  Massage breast to start milk-ejection reflex.  Place nipple and at least 1 inch of areola into baby's mouth.  Place your hand behind the baby's neck and shoulder.  Do not force baby's head into breast.  Put baby in the football hold or clutch hold, supporting his/her neck and head in sniffing position to open his/her throat.  Shape breast into oval shape (sandwich hold)  for deep latch.  Try different feeding positions (cradle, football, side etc.).  Use a breast feeding pillow to bring baby up to the level of the breast.  Use semi-reclined feeding position to allow baby to take an active role and trigger baby's inborn feeding behavior.  Use your hand to support your breast during feeding.  When your baby's mouth is wide open, quickly pull baby into your breast.  Your baby's chin should be pressed into your breast.  Pumping pointers: Air dry nipples, express milk and rub in or use an approved nipple cream after expressing., Apply heat to breasts before pumping., Choose a familiar comfortable place to express milk., If nipples are sore use less pressure and pump more frequently for shorter times., Listen to a tape of baby while pumping., Look at a photo of baby wile  "expressing., Massage breasts before and during pumping., Minimize distractions., Moisten flange before beginning to improve seal., Pick a good time of day to begin (early in a.m., during the baby's long sleep stretch, when skipping a feeding, etc.)., Relax for 5 minutes before pumping., Stimulate the nipples and hand express a few drops before pumping., and Use pumping bra/band for \"hands free\" pumping.  PATIENT INSTRUCTION HANDOUTS GIVEN:   none  LACTATION EDUCATION:  Correct Positioning       "

## 2024-12-13 ENCOUNTER — TELEPHONE (OUTPATIENT)
Dept: LACTATION | Facility: CLINIC | Age: 29
End: 2024-12-13
Payer: COMMERCIAL

## 2024-12-13 NOTE — LACTATION NOTE
Follow up phone call for lactation visit. Had a couple of pain free latches last night so feels like things are improving. Could not get infant to take bottle from her. Discussed having  offer bottle before infant is too hungry and when patient is not around. Discussed trying different bottle types. Denies questions has lactation appointment 12/23/24.

## 2024-12-16 ENCOUNTER — TREATMENT (OUTPATIENT)
Dept: PHYSICAL THERAPY | Facility: CLINIC | Age: 29
End: 2024-12-16
Payer: COMMERCIAL

## 2024-12-16 DIAGNOSIS — M62.81 MUSCLE WEAKNESS: ICD-10-CM

## 2024-12-16 DIAGNOSIS — M62.89 PELVIC FLOOR DYSFUNCTION IN FEMALE: ICD-10-CM

## 2024-12-16 DIAGNOSIS — M79.9 SOFT TISSUE COMPLAINT: ICD-10-CM

## 2024-12-16 DIAGNOSIS — M62.89 MUSCLE TIGHTNESS: ICD-10-CM

## 2024-12-16 PROCEDURE — 97112 NEUROMUSCULAR REEDUCATION: CPT | Mod: GP | Performed by: PHYSICAL THERAPIST

## 2024-12-16 PROCEDURE — 97535 SELF CARE MNGMENT TRAINING: CPT | Mod: GP | Performed by: PHYSICAL THERAPIST

## 2024-12-16 PROCEDURE — 97110 THERAPEUTIC EXERCISES: CPT | Mod: GP | Performed by: PHYSICAL THERAPIST

## 2024-12-16 NOTE — PROGRESS NOTES
Physical Therapy  Physical Therapy Pelvic Floor Evaluation    Name: Kate Hammer  MRN: [id number]  : 1995  Encounter Date: 2024  Visit Count: 2     Time Calculation  Start Time: 1330  Stop Time: 1425  Time Calculation (min): 55 min    Insurance: [employer], no [facility]  Visit # 2 of 90 for     Current Problem:  1. Third degree laceration of perineum, type 3a ([facility])        2. Muscle tightness        3. Muscle weakness        4. Pelvic floor dysfunction in female        5. Soft tissue complaint              General     Precautions     Vital Signs     Pain       Subjective  Chief Complaint: 9 weeks post-partum  11/15/24:  - no pelvic restrictions  - occasional pain between vagina and rectum - tightness  Onset: 10/1/24  CHIKIS: 3a perineal laceration    Today: 24:   - mild vulvar irritation with desensitization and after walking  - noticed increased lower back pain recently    PAIN  Intensity (0-10): 0-3  Location: perineum  Description: pressure, sore, tightness    Prior Level of Function (PLOF)  Exercise/Physical Activity:   Now: walking  Goal: longer walks (couple of miles 1-3), core workouts  Work/School: RN - off of work    Treatment Goals: support PFPT with recovery and ADLs/activities & life    OB/GYN HISTORY: Pregnancies (): 2   Births (Para): 1, Vaginal = 1   Painful [place] or vaginal penetration? No - have not attempted since delivery    BLADDER  Other Symptoms  X Urine stream is intermittent or slow   X Trouble emptying bladder completely - sometimes   X Straining or pushing to empty   X Trouble holding urine when you get an urge - much improved     Objective  Posture: mild increased ant pelvic tilt  SL Stance: hip drop and increased sway bilaterally  DL Squat: dkv  Standing Lumbar Mobility: wnl  SLR: mild rocking and doming  Bridge: lumbar extension  Hip PROM: hypermobility  MMT: sup hip: 4/5 bilaterally  - Hamstrings: mild end range tightness  - Piriformis:  wnl  Diaphragmatic Breathing: fair  Rib Palpation/Positioning: flared  Assess Abdomen  Diastasis Recti: negative  Nicko + Head Lift: functional closure, mild separation with fatigue    Treatments:   - education: home exercise program, plan of care, activity modifications, pain management, and injury pathology  - desensitization: vulva: external  - perineal massage with fingers or pelvic wand  - Vmagic  - breath and pressure management with lifting    Access Code: [email or web address]  STRENGTH  - Supine Pelvic Tilt  - 3-5 x weekly - 10 reps  - Hooklying Transversus Abdominis Palpation  - 3-5 x weekly - 5-10 reps - 2 breaths hold  - Supine Hip Adduction Isometric with Ball  - 3-5 x weekly - 3 sets - 10 reps  - Hooklying Clamshell with Resistance  - 3-5 x weekly - 3 sets - 10 reps  - Bridge  - 3-5 x weekly - 3 sets - 10 rep    STRETCH  - Supine Pelvic Floor Stretch  - 3-5 x weekly - 3 sets - 5 breaths hold  - Supine Gluteus Stretch  - 3-5 x weekly - 3 sets - 20 seconds hold  - Supine Figure 4 Piriformis Stretch  - 3-5 x weekly - 3 sets - 20 seconds hold  - Supine Hamstring Stretch with Strap  - 3-5 x weekly - 3 sets - 20 seconds hold  - Supine Butterfly Groin Stretch  - 3-5 x weekly - 3 sets - 20 seconds hold  - Sidelying Thoracic Rotation with Open Book  - 3-5 x weekly - 3 sets - 20 seconds hold  - Cat Cow  - 3-5 x weekly - 10 reps  - Tail Wag  - 3-5 x weekly - 10 reps  - Child's Pose Stretch  - 3-5 x weekly - 1-3 minutes hold  - Quadruped Thoracic Rotation - Reach Under  - 3-5 x weekly - 3 sets - 30 seconds hold  - Lying Prone  - 3-5 x weekly - 5-10 minutes hold  - Prone Quadriceps Stretch with Strap  - 3-5 x weekly - 3 sets - 20 seconds hold  - Half Kneeling Hip Flexor Stretch  - 3-5 x weekly - 3 sets - 20 seconds hold    Plan for Next Visit:   - assess response to hep  - progress core/hip/functional strength as able/tolerated  - TrA activation and Nicko re-check    Assessment: Patient presents with signs and symptoms consistent  with post-partum recovery following 3a perineal laceration due to vaginal delivery, resulting in pelvic floor dysfunction, muscle tightness, soft tissue compliant, muscle weakness, resulting in limited participation in pain-free ADLs and inability to perform at their prior level of function. Pt would benefit from physical therapy to address the impairments found & listed previously in the objective section in order to return to safe and pain-free ADLs and prior level of function.  - deep core activation improved with cuing, continued weakness    Plan:   Planned Interventions include: therapeutic exercise, self-care home management, manual therapy, therapeutic activities, gait training, neuromuscular coordination, aquatic therapy  Patient and/or family understands and agrees with goals and plan documented: yes  Frequency: 2x/month  Duration: 2-4 months     Candy Gonzalez, PT

## 2024-12-20 NOTE — PROGRESS NOTES
"Assessment   IMPRESSIONS:    1. Type 3a perineal laceration during delivery ([facility]) (Primary)  - suture material still present on exam. Recommend giving more time for further healing. Overall appears to be healing well    2. Clogged duct, postpartum ([facility])  - improved since last visit.     Follow up for annual exam or sooner TERESO Mac MD      Subjective   29 y.o.  presenting for follow up for blocked duct and perineal discomfort    Interval changes:   - milk supply has regulated, stopped pumping  - taking sunflower lechithin  - perineal stitch still bother her    PHYSICAL EXAM    Objective   /72   Ht 1.727 m (5' 8\")   Wt 77.1 kg (170 lb)   LMP 2023 (Approximate)   Breastfeeding Yes   BMI 25.85 kg/m²      General:   Alert and oriented, in no acute distress       Breast/Axilla: Normal breast exam bilaterally with some fullness from milk more in right breast                     "

## 2024-12-23 ENCOUNTER — LACTATION CONSULT (OUTPATIENT)
Dept: LACTATION | Facility: CLINIC | Age: 29
End: 2024-12-23
Payer: COMMERCIAL

## 2024-12-23 DIAGNOSIS — O92.70 LACTATION DISORDER (HHS-HCC): Primary | ICD-10-CM

## 2024-12-23 PROCEDURE — 99211 OFF/OP EST MAY X REQ PHY/QHP: CPT | Performed by: OBSTETRICS & GYNECOLOGY

## 2024-12-23 ASSESSMENT — ENCOUNTER SYMPTOMS
OCCASIONAL FEELINGS OF UNSTEADINESS: 0
LOSS OF SENSATION IN FEET: 0
DEPRESSION: 0

## 2024-12-23 NOTE — PATIENT INSTRUCTIONS
PATIENT DISCUSSION SUMMARY:  .  .  Mother and infant seen s/p lingual and maxillary frenectomy  12/3/24.       Mother reports latch as more comfortable and nipple more rounded after feeds. Mother reports reduction in latch pain since frenectomy, but still continues to have more pain on left.     Suck assessment reveals infant with improved extension, elevation and lateralization.  Cupping remains good.  No humping of tongue noted, and infant with wider latch and improved flange of both upper and lower lips.  Mother has noted infant turning more to left at home. Preferring to turn left in office, but does turn to right. Discussed encouraging infant to look both ways with bright toys and to speak with pediatrician if continues to look more to one side.     Infant doing much more active feeding and with less non-nutritive fluttery sucking.  Discussed with mother that suck will improve over time as tongue strengthens and infant learns to use previously unused muscles.  Encouraged as much feeding at the breast as possible.          Reviewed post birth warning signs and safe sleep     Plan:  Mother to continue suck training exercises prior to feeds, and stretching exercises as recommended by frenotomy provider.  Mother to feed infant on demand at least 8-12 times daily, preferably skin to skin.  Mother to use breast massage and compression during feeding, to facilitate milk transfer.  Mother to offer expressed milk if  infant still hungry.       May pump once daily to offer daily bottle of pumped milk.     To follow up with pediatrician 2/5/24, and with lactation as desires.    Recommend PT/OT evaluation for head turning preference.     Denies questions  LACTATION PROBLEMS AND STRATEGIES:  Problems latching baby to breast: Baby should latch to areola (dark area) not just the nipple.  Baby's lips should be flanged outward.  Bring the baby up to the level of your breast by putting a pillow under the baby.  Dribble milk over  the nipple or express milk so that baby can taste it  Encourage a deeper latch with the asymetrical latch- lining baby's nose opposite mother's nipple.  Encourage nipple to stand up prior to feeing by pumping, nipple rolling or by brief application of ice.  Gently tickle your baby's lip with your nipple to encourage your baby to open his/her mouth wide.  Hold baby so that your breast is positioned deep in the baby's mouth.  Hold your baby close, tummy to tummy.  If baby does not latch to breast, express breast milk.  If engorged, express some milk to soften breast.  If the baby is not latched on well, break seal and gently try again.  Keep baby skin to skin and watch for feeding cues.  Massage breast to start milk-ejection reflex.  Place nipple and at least 1 inch of areola into baby's mouth.  Place your hand behind the baby's neck and shoulder.  Do not force baby's head into breast.  Put baby in the football hold or clutch hold, supporting his/her neck and head in sniffing position to open his/her throat.  Shape breast into oval shape (sandwich hold)  for deep latch.  Try different feeding positions (cradle, football, side etc.).  Use a breast feeding pillow to bring baby up to the level of the breast.  Use semi-reclined feeding position to allow baby to take an active role and trigger baby's inborn feeding behavior.  Use your hand to support your breast during feeding.  When your baby's mouth is wide open, quickly pull baby into your breast.  Your baby's chin should be pressed into your breast.  Pumping pointers: Air dry nipples, express milk and rub in or use an approved nipple cream after expressing., Apply heat to breasts before pumping., Choose a familiar comfortable place to express milk., If nipples are sore use less pressure and pump more frequently for shorter times., Listen to a tape of baby while pumping., Look at a photo of baby wile expressing., Massage breasts before and during pumping., Minimize  "distractions., Moisten flange before beginning to improve seal., Relax for 5 minutes before pumping., Stimulate the nipples and hand express a few drops before pumping., Switch breasts when flow lessens., and Use pumping bra/band for \"hands free\" pumping.  PATIENT INSTRUCTION HANDOUTS GIVEN:   None given.  LACTATION EDUCATION:  Correct Positioning and Correct Latch On       "

## 2024-12-23 NOTE — PROGRESS NOTES
Lactation Counseling Note    Subjective:    Kate Hammer is a 29 y.o. patient referred for lactation counseling. She is here today due to Latch issues and lingual and maxillary frenectomy 12/3/24 . She was referred by her  self .     OB HISTORY:   Healthcare Provider: [facility]   Information: Baby's Name: Vinod Hammer  : [birth year]  MRN: [id number]  Healthcare Provider: [facility]  Birth weight: 3380 gm    Objective:    BREASTFEEDING ASSESSMENT:   Breast Assessment: Large, Symmetrical, Soft, and Compressible  Nipple Assessment/Stage: intact, erect, rounded after feeding, distorted shape or flattened, and sore Stage I - Pain or irritation with no skin breakdown  Areola: Normal  Feeding Position: baby - led, cradle, cross - cradle, football/seated, skin to skin, both sides, nipple to nose, mother demonstrates good positioning, and nose or chin not touching breast  Latch: minimal assistance is needed, instructed on deep latch, eagerly grasped on to latch, areolar attachment, optimal angle of mouth opening, latch is painful, sucking and swallowing, sucks with long jaw movement, chin and lower lip contact breast first, bursts of sucking, swallowing, and rest, upper lip turned in, chin moves in rhythmic motion, correct tongue position, and frequent audible swallows  Suck/Feeding: sustained, coordinated suck/swallow/breathe, baby led rhythmically, and audible swallowing  Alternative Feeding Methods: nursing at the breast and feeding expressed breast milk  Feeding and Cleaning instructions reviewed for: Paced bottle  Infant Feeding Method: Breast milk only  Infant Behavior: awake, active alert, readiness to feed, feeding cues observed, rooting response, and sucking  Infant Information: Ankyloglossia  Post status frenotomy  Palate- high/arch/bubble/normal  Tongue protrudes over alveolar ridge  Good cupping of tongue  Good lateral movement of the tongue  Able to elevate tongue to roof of mouth  Fontanel: flat  Mucous  Membranes: moist  Breast Pain: Pain scale 0-10 (10 most pain): 0  Nipple Pain: Pain scale 0-10 (10 most pain): 0-2  Pain description: pinchy  Before feeding pain 0-10 (10 most pain): 1-2  After adjustment pain 0-10 (10 most pain): 1  Other pain relief methods: nothing  No other   Weight: Reported pediatrician visit weight: 5880 gm  Date of pediatrician weight: 24  Weight before feedin gm  Weight after feedin gm  Amount of breast milk transferred: 108 ml  Number of voids in the last 24 hours: 10-12  Number of stools in the last 24 hours: 4      PATIENT DISCUSSION SUMMARY:  .  .  Mother and infant seen s/p lingual and maxillary frenectomy  12/3/24.       Mother reports latch as more comfortable and nipple more rounded after feeds. Mother reports reduction in latch pain since frenectomy, but still continues to have more pain on left.     Suck assessment reveals infant with improved extension, elevation and lateralization.  Cupping remains good.  No humping of tongue noted, and infant with wider latch and improved flange of both upper and lower lips.  Mother has noted infant turning more to left at home. Preferring to turn left in office, but does turn to right. Discussed encouraging infant to look both ways with bright toys and to speak with pediatrician if continues to look more to one side.     Infant doing much more active feeding and with less non-nutritive fluttery sucking.  Discussed with mother that suck will improve over time as tongue strengthens and infant learns to use previously unused muscles.  Encouraged as much feeding at the breast as possible.          Reviewed post birth warning signs and safe sleep     Plan:  Mother to continue suck training exercises prior to feeds, and stretching exercises as recommended by frenotomy provider.  Mother to feed infant on demand at least 8-12 times daily, preferably skin to skin.  Mother to use breast massage and compression during feeding, to  facilitate milk transfer.  Mother to offer expressed milk if  infant still hungry.       May pump once daily to offer daily bottle of pumped milk.     To follow up with pediatrician 2/5/24, and with lactation as desires.    Recommend PT/OT evaluation for head turning preference.     Denies questions  LACTATION PROBLEMS AND STRATEGIES:  Problems latching baby to breast: Baby should latch to areola (dark area) not just the nipple.  Baby's lips should be flanged outward.  Bring the baby up to the level of your breast by putting a pillow under the baby.  Dribble milk over the nipple or express milk so that baby can taste it  Encourage a deeper latch with the asymetrical latch- lining baby's nose opposite mother's nipple.  Encourage nipple to stand up prior to feeing by pumping, nipple rolling or by brief application of ice.  Gently tickle your baby's lip with your nipple to encourage your baby to open his/her mouth wide.  Hold baby so that your breast is positioned deep in the baby's mouth.  Hold your baby close, tummy to tummy.  If baby does not latch to breast, express breast milk.  If engorged, express some milk to soften breast.  If the baby is not latched on well, break seal and gently try again.  Keep baby skin to skin and watch for feeding cues.  Massage breast to start milk-ejection reflex.  Place nipple and at least 1 inch of areola into baby's mouth.  Place your hand behind the baby's neck and shoulder.  Do not force baby's head into breast.  Put baby in the football hold or clutch hold, supporting his/her neck and head in sniffing position to open his/her throat.  Shape breast into oval shape (sandwich hold)  for deep latch.  Try different feeding positions (cradle, football, side etc.).  Use a breast feeding pillow to bring baby up to the level of the breast.  Use semi-reclined feeding position to allow baby to take an active role and trigger baby's inborn feeding behavior.  Use your hand to support your  "breast during feeding.  When your baby's mouth is wide open, quickly pull baby into your breast.  Your baby's chin should be pressed into your breast.  Pumping pointers: Air dry nipples, express milk and rub in or use an approved nipple cream after expressing., Apply heat to breasts before pumping., Choose a familiar comfortable place to express milk., If nipples are sore use less pressure and pump more frequently for shorter times., Listen to a tape of baby while pumping., Look at a photo of baby wile expressing., Massage breasts before and during pumping., Minimize distractions., Moisten flange before beginning to improve seal., Relax for 5 minutes before pumping., Stimulate the nipples and hand express a few drops before pumping., Switch breasts when flow lessens., and Use pumping bra/band for \"hands free\" pumping.  PATIENT INSTRUCTION HANDOUTS GIVEN:   None given.  LACTATION EDUCATION:  Correct Positioning and Correct Latch On       "

## 2024-12-24 ENCOUNTER — APPOINTMENT (OUTPATIENT)
Dept: OBSTETRICS AND GYNECOLOGY | Facility: CLINIC | Age: 29
End: 2024-12-24
Payer: COMMERCIAL

## 2024-12-24 VITALS
BODY MASS INDEX: 25.76 KG/M2 | WEIGHT: 170 LBS | HEIGHT: 68 IN | SYSTOLIC BLOOD PRESSURE: 110 MMHG | DIASTOLIC BLOOD PRESSURE: 72 MMHG

## 2024-12-24 DIAGNOSIS — O92.79 CLOGGED DUCT, POSTPARTUM (HHS-HCC): ICD-10-CM

## 2024-12-24 PROCEDURE — 3074F SYST BP LT 130 MM HG: CPT | Performed by: OBSTETRICS & GYNECOLOGY

## 2024-12-24 PROCEDURE — 3078F DIAST BP <80 MM HG: CPT | Performed by: OBSTETRICS & GYNECOLOGY

## 2024-12-24 PROCEDURE — 3008F BODY MASS INDEX DOCD: CPT | Performed by: OBSTETRICS & GYNECOLOGY

## 2024-12-24 PROCEDURE — 1036F TOBACCO NON-USER: CPT | Performed by: OBSTETRICS & GYNECOLOGY

## 2024-12-24 PROCEDURE — 99213 OFFICE O/P EST LOW 20 MIN: CPT | Performed by: OBSTETRICS & GYNECOLOGY

## 2024-12-24 ASSESSMENT — PAIN SCALES - GENERAL: PAINLEVEL_OUTOF10: 0-NO PAIN

## 2024-12-26 ENCOUNTER — TELEPHONE (OUTPATIENT)
Dept: LACTATION | Facility: CLINIC | Age: 29
End: 2024-12-26
Payer: COMMERCIAL

## 2024-12-26 NOTE — LACTATION NOTE
Follow up phone call for lactation visit. Still having more pain with latch on left andbaby struggling more on that side. Plans to ask for PT referral since infant has head turning preference. Denies additional questions at this time.  Lactation appointment scheduled 1/2/25

## 2025-01-02 ENCOUNTER — LACTATION CONSULT (OUTPATIENT)
Dept: LACTATION | Facility: CLINIC | Age: 30
End: 2025-01-02
Payer: COMMERCIAL

## 2025-01-02 DIAGNOSIS — O92.70 LACTATION DISORDER (HHS-HCC): Primary | ICD-10-CM

## 2025-01-02 PROCEDURE — 99211 OFF/OP EST MAY X REQ PHY/QHP: CPT | Performed by: OBSTETRICS & GYNECOLOGY

## 2025-01-02 ASSESSMENT — ENCOUNTER SYMPTOMS
DEPRESSION: 0
OCCASIONAL FEELINGS OF UNSTEADINESS: 0
LOSS OF SENSATION IN FEET: 0

## 2025-01-02 NOTE — PROGRESS NOTES
Lactation Counseling Note    Subjective:    Kate Hammer is a 29 y.o. patient referred for lactation counseling. She is here today due to Latch issues and s/p lingual and maxillary frenectomy 12/3/24 . She was referred by her  self .     OB HISTORY:   Healthcare Provider: [facility]  [place] Information: Baby's Name: Vinod Hammer  : [birth year]  MRN: [id number]  Healthcare Provider: [facility]  Birth weight: 3380 gm    Objective:    BREASTFEEDING ASSESSMENT:   Breast Assessment: Large, Symmetrical, Soft, and Compressible  Nipple Assessment/Stage: intact, erect, and distorted shape or flattened Stage I - Pain or irritation with no skin breakdown  Areola: Normal  Feeding Position: cross - cradle, skin to skin, one sided nursing, nipple to nose, and mother demonstrates good positioning  Latch: eagerly grasped on to latch, areolar attachment, cries while latching, optimal angle of mouth opening, latch is painful, pain during feeding, sucking and swallowing, sucks with long jaw movement, chin and lower lip contact breast first, bursts of sucking, swallowing, and rest, lower lip turned in, flanged lips, chin moves in rhythmic motion, and frequent audible swallows  Suck/Feeding: sustained, coordinated suck/swallow/breathe, baby led rhythmically, audible swallowing, and content after feeding  Alternative Feeding Methods: nursing at the breast and feeding expressed breast milk  Feeding and Cleaning instructions reviewed for: Paced bottle  Infant Feeding Method: Breast milk only  Infant Behavior: awake, active alert, fussy, feeding cues observed, and sucking  Infant Information: Ankyloglossia  Post status frenotomy  Tongue protrudes over alveolar ridge  Good cupping of tongue  Good lateral movement of the tongue  Able to elevate tongue to roof of mouth  Fontanel: flat  Mucous Membranes: moist  Breast Pain: Pain scale 0-10 (10 most pain): 0  Nipple Pain: Pain scale 0-10 (10 most pain): 2-3  Pain description: pinching,  burning  Before feeding pain 0-10 (10 most pain): 2-3  After adjustment pain 0-10 (10 most pain): 2-3  Other pain relief methods: mignon  Weight: Reported pediatrician visit weight: 5880 gm  Date of pediatrician weight: 24  Weight before feedin gm  Weight after feedin gm  Amount of breast milk transferred: 46 ml  Number of voids in the last 24 hours: 10-12  Number of stools in the last 24 hours: 4-5  No other concerns        PATIENT DISCUSSION SUMMARY:  .  .  Mother and infant seen s/p lingual and maxillary frenectomy  12/3/24.       Mother reports latch as more comfortable and nipple more rounded after feeds. Mother reports reduction in latch pain since frenectomy, but still continues to have more pain on left.     Suck assessment reveals infant with improved extension, elevation and lateralization.  Cupping remains good.  No humping of tongue noted, and infant with wider latch and improved flange of both upper and lower lips.  Mother has noted infant turning more to left at home. Preferring to turn left in office, but does turn to right. Discussed encouraging infant to look both ways with bright toys and to speak with pediatrician if continues to look more to one side.     Infant doing much more active feeding and with less non-nutritive fluttery sucking.  Discussed with mother that suck will improve over time as tongue strengthens and infant learns to use previously unused muscles.  Encouraged as much feeding at the breast as possible.        Infant very sociable and distracted and would only nurse briefly on one side.    Reviewed post birth warning signs and safe sleep     Plan:  Mother to continue suck training exercises prior to feeds, and stretching exercises as recommended by frenotomy provider.  Mother to feed infant on demand at least 8-12 times daily, preferably skin to skin.  Mother to use breast massage and compression during feeding, to facilitate milk transfer.  Mother to offer  expressed milk if  infant still hungry.       May pump once daily to offer daily bottle of pumped milk.     To follow up with pediatrician 2/5/24, and with lactation as desires.  Follow up with pediatric dentist for potential reattachment since latch more painful/     Recommend PT/OT evaluation for head turning preference.     Denies questions  LACTATION PROBLEMS AND STRATEGIES:  Problems latching baby to breast: Baby should latch to areola (dark area) not just the nipple.  Baby's lips should be flanged outward.  Bring the baby up to the level of your breast by putting a pillow under the baby.  Dribble milk over the nipple or express milk so that baby can taste it  Encourage a deeper latch with the asymetrical latch- lining baby's nose opposite mother's nipple.  Encourage nipple to stand up prior to feeing by pumping, nipple rolling or by brief application of ice.  Gently tickle your baby's lip with your nipple to encourage your baby to open his/her mouth wide.  Hold baby so that your breast is positioned deep in the baby's mouth.  Hold your baby close, tummy to tummy.  If baby does not latch to breast, express breast milk.  If the baby is not latched on well, break seal and gently try again.  Keep baby skin to skin and watch for feeding cues.  Massage breast to start milk-ejection reflex.  Place nipple and at least 1 inch of areola into baby's mouth.  Place your hand behind the baby's neck and shoulder.  Do not force baby's head into breast.  Put baby in the football hold or clutch hold, supporting his/her neck and head in sniffing position to open his/her throat.  Shape breast into oval shape (sandwich hold)  for deep latch.  Try different feeding positions (cradle, football, side etc.).  Use a breast feeding pillow to bring baby up to the level of the breast.  Use semi-reclined feeding position to allow baby to take an active role and trigger baby's inborn feeding behavior.  Use your hand to support your breast  "during feeding.  When your baby's mouth is wide open, quickly pull baby into your breast.  Your baby's chin should be pressed into your breast.  Pumping pointers: Air dry nipples, express milk and rub in or use an approved nipple cream after expressing., Apply heat to breasts before pumping., Choose a familiar comfortable place to express milk., If nipples are sore use less pressure and pump more frequently for shorter times., Listen to a tape of baby while pumping., Look at a photo of baby wile expressing., Massage breasts before and during pumping., Minimize distractions., Moisten flange before beginning to improve seal., Pick a good time of day to begin (early in a.m., during the baby's long sleep stretch, when skipping a feeding, etc.)., Relax for 5 minutes before pumping., Stimulate the nipples and hand express a few drops before pumping., and Use pumping bra/band for \"hands free\" pumping.  PATIENT INSTRUCTION HANDOUTS GIVEN:   none  LACTATION EDUCATION:  Correct Positioning and Correct Latch On       "

## 2025-01-02 NOTE — PATIENT INSTRUCTIONS
PATIENT DISCUSSION SUMMARY:  .  .  Mother and infant seen s/p lingual and maxillary frenectomy  12/3/24.       Mother reports latch as more comfortable and nipple more rounded after feeds. Mother reports reduction in latch pain since frenectomy, but still continues to have more pain on left.     Suck assessment reveals infant with improved extension, elevation and lateralization.  Cupping remains good.  No humping of tongue noted, and infant with wider latch and improved flange of both upper and lower lips.  Mother has noted infant turning more to left at home. Preferring to turn left in office, but does turn to right. Discussed encouraging infant to look both ways with bright toys and to speak with pediatrician if continues to look more to one side.     Infant doing much more active feeding and with less non-nutritive fluttery sucking.  Discussed with mother that suck will improve over time as tongue strengthens and infant learns to use previously unused muscles.  Encouraged as much feeding at the breast as possible.        Infant very sociable and distracted and would only nurse briefly on one side.    Reviewed post birth warning signs and safe sleep     Plan:  Mother to continue suck training exercises prior to feeds, and stretching exercises as recommended by frenotomy provider.  Mother to feed infant on demand at least 8-12 times daily, preferably skin to skin.  Mother to use breast massage and compression during feeding, to facilitate milk transfer.  Mother to offer expressed milk if  infant still hungry.       May pump once daily to offer daily bottle of pumped milk.     To follow up with pediatrician 2/5/24, and with lactation as desires.  Follow up with pediatric dentist for potential reattachment since latch more painful/     Recommend PT/OT evaluation for head turning preference.     Denies questions  LACTATION PROBLEMS AND STRATEGIES:  Problems latching baby to breast: Baby should latch to areola (dark  area) not just the nipple.  Baby's lips should be flanged outward.  Bring the baby up to the level of your breast by putting a pillow under the baby.  Dribble milk over the nipple or express milk so that baby can taste it  Encourage a deeper latch with the asymetrical latch- lining baby's nose opposite mother's nipple.  Encourage nipple to stand up prior to feeing by pumping, nipple rolling or by brief application of ice.  Gently tickle your baby's lip with your nipple to encourage your baby to open his/her mouth wide.  Hold baby so that your breast is positioned deep in the baby's mouth.  Hold your baby close, tummy to tummy.  If baby does not latch to breast, express breast milk.  If the baby is not latched on well, break seal and gently try again.  Keep baby skin to skin and watch for feeding cues.  Massage breast to start milk-ejection reflex.  Place nipple and at least 1 inch of areola into baby's mouth.  Place your hand behind the baby's neck and shoulder.  Do not force baby's head into breast.  Put baby in the football hold or clutch hold, supporting his/her neck and head in sniffing position to open his/her throat.  Shape breast into oval shape (sandwich hold)  for deep latch.  Try different feeding positions (cradle, football, side etc.).  Use a breast feeding pillow to bring baby up to the level of the breast.  Use semi-reclined feeding position to allow baby to take an active role and trigger baby's inborn feeding behavior.  Use your hand to support your breast during feeding.  When your baby's mouth is wide open, quickly pull baby into your breast.  Your baby's chin should be pressed into your breast.  Pumping pointers: Air dry nipples, express milk and rub in or use an approved nipple cream after expressing., Apply heat to breasts before pumping., Choose a familiar comfortable place to express milk., If nipples are sore use less pressure and pump more frequently for shorter times., Listen to a tape of  "baby while pumping., Look at a photo of baby wile expressing., Massage breasts before and during pumping., Minimize distractions., Moisten flange before beginning to improve seal., Pick a good time of day to begin (early in a.m., during the baby's long sleep stretch, when skipping a feeding, etc.)., Relax for 5 minutes before pumping., Stimulate the nipples and hand express a few drops before pumping., and Use pumping bra/band for \"hands free\" pumping.  PATIENT INSTRUCTION HANDOUTS GIVEN:   none  LACTATION EDUCATION:  Correct Positioning and Correct Latch On       "

## 2025-01-03 ENCOUNTER — TELEPHONE (OUTPATIENT)
Dept: LACTATION | Facility: CLINIC | Age: 30
End: 2025-01-03
Payer: COMMERCIAL

## 2025-01-03 NOTE — LACTATION NOTE
Follow up phone call for lactation visit. Has appointment with dentist and physical therapist. Will work on exercises and follow up with lactation next week.

## 2025-01-07 ENCOUNTER — TELEPHONE (OUTPATIENT)
Dept: LACTATION | Facility: CLINIC | Age: 30
End: 2025-01-07

## 2025-01-07 ENCOUNTER — TREATMENT (OUTPATIENT)
Dept: PHYSICAL THERAPY | Facility: CLINIC | Age: 30
End: 2025-01-07
Payer: COMMERCIAL

## 2025-01-07 DIAGNOSIS — M62.89 MUSCLE TIGHTNESS: ICD-10-CM

## 2025-01-07 DIAGNOSIS — M62.89 PELVIC FLOOR DYSFUNCTION IN FEMALE: ICD-10-CM

## 2025-01-07 DIAGNOSIS — M62.81 MUSCLE WEAKNESS: ICD-10-CM

## 2025-01-07 DIAGNOSIS — M79.9 SOFT TISSUE COMPLAINT: ICD-10-CM

## 2025-01-07 PROCEDURE — 97535 SELF CARE MNGMENT TRAINING: CPT | Mod: GP | Performed by: PHYSICAL THERAPIST

## 2025-01-07 NOTE — PROGRESS NOTES
Physical Therapy  Physical Therapy Pelvic Floor Evaluation    Name: Kate Hammer  MRN: [id number]  : 1995  Encounter Date: 2025  Visit Count: 3     Time Calculation  Start Time: 1530  Stop Time: 1600  Time Calculation (min): 30 min    Insurance: [employer], no auth  Visit # 1 of 90 for  (2 used in )    Current Problem:  1. Third degree laceration of perineum, type 3a ([facility]-HCC)  Follow Up In Physical Therapy      2. Muscle tightness  Follow Up In Physical Therapy      3. Muscle weakness  Follow Up In Physical Therapy      4. Pelvic floor dysfunction in female  Follow Up In Physical Therapy      5. Soft tissue complaint  Follow Up In Physical Therapy              General     Precautions     Vital Signs     Pain       Subjective  Chief Complaint: 9 weeks post-partum  11/15/24:  - no pelvic restrictions  - occasional pain between vagina and rectum - tightness  Onset: 10/1/24  CHIKIS: 3a perineal laceration    24:   - mild vulvar irritation with desensitization and after walking  - noticed increased lower back pain recently    25:   - still having sensitivity to external touching and perineal massage around incisions  - intercourse 2x since last visit - discomfort/pain at opening with initial penetration and deep (burning)  - has not attempted orgasm at this time  - lubricant has been helpful  - hep compliance: less often due to life transition and focus on child    PAIN  Intensity (0-10): 0-3  Location: perineum  Description: pressure, sore, tightness    Prior Level of Function (PLOF)  Exercise/Physical Activity:   Now: walking  Goal: longer walks (couple of miles 1-3), core workouts  Work/School: RN - off of work    Treatment Goals: support PFPT with recovery and ADLs/activities & life    OB/GYN HISTORY: Pregnancies (): 2   Births (Para): 1, Vaginal = 1   Painful [place] or vaginal penetration? No - have not attempted since delivery    BLADDER  Other Symptoms  X Urine stream  is intermittent or slow   X Trouble emptying bladder completely - sometimes   X Straining or pushing to empty   X Trouble holding urine when you get an urge - much improved     Objective  Posture: mild increased ant pelvic tilt  SL Stance: hip drop and increased sway bilaterally  DL Squat: dkv  Standing Lumbar Mobility: wnl  SLR: mild rocking and doming  Bridge: lumbar extension  Hip PROM: hypermobility  MMT: sup hip: 4/5 bilaterally  - Hamstrings: mild end range tightness  - Piriformis: wnl  Diaphragmatic Breathing: fair  Rib Palpation/Positioning: flared  Assess Abdomen  Diastasis Recti: negative  Nicko + Head Lift: functional closure, mild separation with fatigue    Treatments:   - education: home exercise program, plan of care, activity modifications, pain management, and injury pathology  - desensitization: vulva: external  - perineal massage with fingers or pelvic wand  - Vmagic  - breath and pressure management with lifting  - discussed pudendal nerve sensitivity and monitoring symptoms, being more gentle and slow with touching and soft tissue work    Access Code: [email or web address]  STRENGTH  - Supine Pelvic Tilt  - 3-5 x weekly - 10 reps  - Hooklying Transversus Abdominis Palpation  - 3-5 x weekly - 5-10 reps - 2 breaths hold  - Supine Hip Adduction Isometric with Ball  - 3-5 x weekly - 3 sets - 10 reps  - Hooklying Clamshell with Resistance  - 3-5 x weekly - 3 sets - 10 reps  - Bridge  - 3-5 x weekly - 3 sets - 10 rep    STRETCH  - Supine Pelvic Floor Stretch  - 3-5 x weekly - 3 sets - 5 breaths hold  - Supine Gluteus Stretch  - 3-5 x weekly - 3 sets - 20 seconds hold  - Supine Figure 4 Piriformis Stretch  - 3-5 x weekly - 3 sets - 20 seconds hold  - Supine Hamstring Stretch with Strap  - 3-5 x weekly - 3 sets - 20 seconds hold  - Supine Butterfly Groin Stretch  - 3-5 x weekly - 3 sets - 20 seconds hold  - Sidelying Thoracic Rotation with Open Book  - 3-5 x weekly - 3 sets - 20 seconds hold  - Cat Cow  - 3-5 x weekly  - 10 reps  - Tail Wag  - 3-5 x weekly - 10 reps  - Child's Pose Stretch  - 3-5 x weekly - 1-3 minutes hold  - Quadruped Thoracic Rotation - Reach Under  - 3-5 x weekly - 3 sets - 30 seconds hold  - Lying Prone  - 3-5 x weekly - 5-10 minutes hold  - Prone Quadriceps Stretch with Strap  - 3-5 x weekly - 3 sets - 20 seconds hold  - Half Kneeling Hip Flexor Stretch  - 3-5 x weekly - 3 sets - 20 seconds hold    Plan for Next Visit:   - assess response to hep  - progress core/hip/functional strength as able/tolerated  - TrA activation and Nicko re-check    Assessment: Patient presents with signs and symptoms consistent with post-partum recovery following 3a perineal laceration due to vaginal delivery, resulting in pelvic floor dysfunction, muscle tightness, soft tissue compliant, muscle weakness, resulting in limited participation in pain-free ADLs and inability to perform at their prior level of function. Pt would benefit from physical therapy to address the impairments found & listed previously in the objective section in order to return to safe and pain-free ADLs and prior level of function.  - continued perineal sensitivity    Plan:   Planned Interventions include: therapeutic exercise, self-care home management, manual therapy, therapeutic activities, gait training, neuromuscular coordination, aquatic therapy  Patient and/or family understands and agrees with goals and plan documented: yes  Frequency: 2x/month  Duration: 2-4 months     Candy Gonzalez, PT

## 2025-01-08 NOTE — LACTATION NOTE
Patient had PT appointment for infant today to work on left sided tightness. Will also follow up with OT for additional evaluation and follow up with lactation as needed.

## 2025-01-21 ENCOUNTER — APPOINTMENT (OUTPATIENT)
Dept: PHYSICAL THERAPY | Facility: CLINIC | Age: 30
End: 2025-01-21
Payer: COMMERCIAL

## 2025-02-18 ENCOUNTER — APPOINTMENT (OUTPATIENT)
Dept: PHYSICAL THERAPY | Facility: CLINIC | Age: 30
End: 2025-02-18
Payer: COMMERCIAL

## 2025-04-10 ENCOUNTER — APPOINTMENT (OUTPATIENT)
Dept: OPHTHALMOLOGY | Facility: CLINIC | Age: 30
End: 2025-04-10
Payer: COMMERCIAL

## 2025-04-10 DIAGNOSIS — Z01.00 EXAMINATION OF EYES AND VISION: Primary | ICD-10-CM

## 2025-04-10 PROCEDURE — LSREX EMPLOYEE LASER EYE EXAM: Performed by: OPTOMETRIST

## 2025-04-10 ASSESSMENT — CONF VISUAL FIELD
OS_NORMAL: 1
OS_INFERIOR_NASAL_RESTRICTION: 0
OS_INFERIOR_TEMPORAL_RESTRICTION: 0
OD_INFERIOR_TEMPORAL_RESTRICTION: 0
OD_SUPERIOR_TEMPORAL_RESTRICTION: 0
OD_INFERIOR_NASAL_RESTRICTION: 0
OD_SUPERIOR_NASAL_RESTRICTION: 0
OS_SUPERIOR_TEMPORAL_RESTRICTION: 0
OD_NORMAL: 1
OS_SUPERIOR_NASAL_RESTRICTION: 0

## 2025-04-10 ASSESSMENT — ENCOUNTER SYMPTOMS
ALLERGIC/IMMUNOLOGIC NEGATIVE: 0
NEUROLOGICAL NEGATIVE: 0
CONSTITUTIONAL NEGATIVE: 0
HEMATOLOGIC/LYMPHATIC NEGATIVE: 0
EYES NEGATIVE: 1
GASTROINTESTINAL NEGATIVE: 0
CARDIOVASCULAR NEGATIVE: 0
ENDOCRINE NEGATIVE: 0
MUSCULOSKELETAL NEGATIVE: 0
RESPIRATORY NEGATIVE: 0
PSYCHIATRIC NEGATIVE: 0

## 2025-04-10 ASSESSMENT — SLIT LAMP EXAM - LIDS
COMMENTS: NORMAL
COMMENTS: NORMAL

## 2025-04-10 ASSESSMENT — VISUAL ACUITY
OD_SC: 20/20
METHOD: SNELLEN - LINEAR
OS_SC: 20/20

## 2025-04-10 ASSESSMENT — EXTERNAL EXAM - LEFT EYE: OS_EXAM: NORMAL

## 2025-04-10 ASSESSMENT — TONOMETRY
OS_IOP_MMHG: 15
IOP_METHOD: GOLDMANN APPLANATION
OD_IOP_MMHG: 15

## 2025-04-10 ASSESSMENT — CUP TO DISC RATIO
OD_RATIO: 0.3
OS_RATIO: 0.3

## 2025-04-10 ASSESSMENT — EXTERNAL EXAM - RIGHT EYE: OD_EXAM: NORMAL

## 2025-04-10 NOTE — PROGRESS NOTES
Employee laser eye exam.  All findings normal.  The patient was asked to return to our clinic in one year or sooner if ocular or vision changes occur.

## 2025-05-01 ENCOUNTER — TELEMEDICINE (OUTPATIENT)
Dept: OBSTETRICS AND GYNECOLOGY | Facility: CLINIC | Age: 30
End: 2025-05-01
Payer: COMMERCIAL

## 2025-05-01 DIAGNOSIS — O91.23 MASTITIS ASSOCIATED WITH LACTATION: Primary | ICD-10-CM

## 2025-05-01 PROCEDURE — 99213 OFFICE O/P EST LOW 20 MIN: CPT | Performed by: OBSTETRICS & GYNECOLOGY

## 2025-05-01 RX ORDER — DICLOXACILLIN SODIUM 500 MG/1
500 CAPSULE ORAL 4 TIMES DAILY
Qty: 40 CAPSULE | Refills: 0 | Status: SHIPPED | OUTPATIENT
Start: 2025-05-01 | End: 2025-05-11

## 2025-05-01 NOTE — PROGRESS NOTES
Assessment     This was a Telemedicine Visit over a virtual platform.  All medical issues were discussed and addressed but no physical exam was performed, other than what could be evaluated over video and with discussion.  The patient verbally consented to the visit.  Spent 20 minutes with the patient       PLAN  1. Blocked duct/possible early inflammatory  mastitis  - recommend 24 hours of ice and ibuprofen. If no improvement of symptoms then can start antibiotic course  - also has milk bleb on nipple, has triamcinolone cream at home to use 2-3 times daily for 2-3 days or until milk bled resolves.  - dicloxacillin (Dynapen) 500 mg capsule; Take 1 capsule (500 mg) by mouth 4 times a day for 10 days.  Dispense: 40 capsule; Refill: 0    Please follow up PRN    Maria De Jesus Mac MD      Subjective     Kate Hammer is a 30 y.o. female who is here for a virtual visit for blocked milk duct, possible mstitis  PCP = Floresita Turner,     HPI:   - baby is 7 months old now and she is exclusively breastfeeding/pumping  - last night ~ 8pm she developed a painful right sided breast lump. Ut was larger this morning and she noticed a milk bleb on nipple. She has been using ibuprofen with  no improvement yet. She does not have fevers/chills.       PMH, PSH, FH, SH, medications and allergies reviewed and edited as needed.      Objective   There were no vitals taken for this visit.     General:   Alert and oriented, in no acute distress       Breast/Axilla: Breast examined virtually, no erythema on breasts bilaterally. Milk bleb noted on right side

## 2025-07-08 ENCOUNTER — OFFICE VISIT (OUTPATIENT)
Dept: URGENT CARE | Age: 30
End: 2025-07-08
Payer: COMMERCIAL

## 2025-07-08 VITALS
DIASTOLIC BLOOD PRESSURE: 75 MMHG | OXYGEN SATURATION: 98 % | SYSTOLIC BLOOD PRESSURE: 113 MMHG | HEART RATE: 69 BPM | TEMPERATURE: 98.2 F | RESPIRATION RATE: 18 BRPM

## 2025-07-08 DIAGNOSIS — J02.9 SORE THROAT: ICD-10-CM

## 2025-07-08 DIAGNOSIS — R05.2 SUBACUTE COUGH: Primary | ICD-10-CM

## 2025-07-08 LAB — POC RAPID MONO: NEGATIVE

## 2025-07-08 PROCEDURE — 86308 HETEROPHILE ANTIBODY SCREEN: CPT

## 2025-07-08 PROCEDURE — 3074F SYST BP LT 130 MM HG: CPT

## 2025-07-08 PROCEDURE — 3078F DIAST BP <80 MM HG: CPT

## 2025-07-08 PROCEDURE — 99203 OFFICE O/P NEW LOW 30 MIN: CPT

## 2025-07-08 ASSESSMENT — PAIN SCALES - GENERAL: PAINLEVEL_OUTOF10: 5

## 2025-07-08 ASSESSMENT — ENCOUNTER SYMPTOMS
SORE THROAT: 1
COUGH: 1

## 2025-07-08 NOTE — PATIENT INSTRUCTIONS
You were seen at Urgent Care today for ongoing cough and sore throat.  Please treat as discussed.  Monitor for red flags which we spoke about, If your symptoms change, worsen or become concerning in any way, please go to the emergency room immediately, otherwise you can followup with your PCP in 2-3 days as needed

## 2025-07-08 NOTE — PROGRESS NOTES
Subjective   Patient ID: Kate Hammer is a 30 y.o. female. They present today with a chief complaint of Sore Throat ([id number], says painful to swallow on lft side ) and Cough ([id number]).    History of Present Illness  Patient is a 30-year-old female with history of GERD who presents urgent care today with a complaint of ongoing sore throat and cough.  She states her symptoms started approximately 1 month ago.  She notes her son was diagnosed with rhinovirus approximately 6 weeks ago.  She believes she also caught the virus which then, unfortunately turned into a sinus infection.  She just recently completed a course of amoxicillin prescribed by her PCP.  She states her sinus symptoms have mostly resolved but she continues to have sore throat and dry cough which is worse at night.  She is tried multiple over-the-counter medications without significant relief.  She denies any fevers, chills, nausea, vomiting, chest pain or shortness of breath.  No other complaints or concerns mention at this time.      History provided by:  Patient  Sore Throat   Associated symptoms include coughing.   Cough  Associated symptoms include a sore throat.       Past Medical History  Allergies as of 07/08/2025    (No Known Allergies)       Prescriptions Prior to Admission[1]       Medical History[2]    Surgical History[3]     reports that she has never smoked. She has never used smokeless tobacco. She reports that she does not drink alcohol and does not use drugs.    Review of Systems  Review of Systems   HENT:  Positive for sore throat.    Respiratory:  Positive for cough.                                   Objective    Vitals:    07/08/25 1630   BP: 113/75   Pulse: 69   Resp: 18   Temp: 36.8 °C (98.2 °F)   TempSrc: Oral   SpO2: 98%     No LMP recorded.    Physical Exam  Vitals and nursing note reviewed.   Constitutional:       General: She is not in acute distress.     Appearance: Normal appearance. She is not ill-appearing,  toxic-appearing or diaphoretic.   HENT:      Head: Normocephalic and atraumatic.      Right Ear: Tympanic membrane, ear canal and external ear normal.      Left Ear: Tympanic membrane, ear canal and external ear normal.      Nose: Nose normal.      Mouth/Throat:      Lips: Pink.      Mouth: Mucous membranes are moist.      Dentition: No dental tenderness, gingival swelling or dental abscesses.      Tongue: No lesions. Tongue does not deviate from midline.      Palate: No mass and lesions.      Pharynx: Oropharynx is clear. Uvula midline. Posterior oropharyngeal erythema present. No pharyngeal swelling, oropharyngeal exudate, uvula swelling or postnasal drip.      Tonsils: No tonsillar exudate or tonsillar abscesses.   Eyes:      Extraocular Movements: Extraocular movements intact.      Conjunctiva/sclera: Conjunctivae normal.      Pupils: Pupils are equal, round, and reactive to light.   Cardiovascular:      Rate and Rhythm: Normal rate and regular rhythm.      Pulses: Normal pulses.      Heart sounds: Normal heart sounds.   Pulmonary:      Effort: Pulmonary effort is normal. No respiratory distress.      Breath sounds: Normal breath sounds. No stridor. No wheezing, rhonchi or rales.   Chest:      Chest wall: No tenderness.   Musculoskeletal:         General: Normal range of motion.      Cervical back: Normal range of motion and neck supple.   Skin:     General: Skin is warm and dry.      Capillary Refill: Capillary refill takes less than 2 seconds.   Neurological:      General: No focal deficit present.      Mental Status: She is alert and oriented to person, place, and time.   Psychiatric:         Mood and Affect: Mood normal.         Behavior: Behavior normal.         Procedures      Assessment/Plan   Allergies, medications, history, and pertinent labs/EKGs/Imaging reviewed by JEAN Lau-CNP.     Medical Decision Making    Patient is well appearing, afebrile, non toxic, not hypoxic, and appropriate for  outpatient treatment and management at time of evaluation. Patient presents with sore throat and cough x 4 weeks.     Differential includes but not limited to: Bronchitis, URI, postnasal drip, GERD, other    On exam, patient has mild bilateral erythema of the oropharynx without appreciable swelling or exudate.  No lesions or clinical signs of peritonsillar abscess appreciated.  Uvula midline.  Lung sounds are clear in all fields bilaterally.  No sinus pain or pressure with palpation.  Vitals within normal limits.  Oxygen saturation on room air is 90%.  Patient is afebrile and appears well-hydrated.  Exam otherwise unremarkable and as described above.    Considered testing for streptococcal pharyngitis however symptoms have been ongoing for 1 month and patient just completed a course of amoxicillin making this diagnosis less likely.  Rapid monotest is negative.  Low suspicion for URI based on history and exam.  Suspect patient's symptoms may be secondary to GERD.  Recommended treatment with Mylanta and/or famotidine as these are both safe to use while breast-feeding.  Also recommended close follow-up with her PCP as she may require ENT evaluation.  She was given the option as questions.    Discussed return precautions and importance of follow-up.  We spoke at length regarding the red flags he/she should be aware of and monitor for.  I specifically advised to go to the ED for changing or worsening symptoms, new symptoms, complaint specific precautions, and precautions listed on the discharge paperwork.    The plan of care was mutually agreed upon with the patient. All questions and concerns were answered to the best of my ability with today's information.  Patient was discharged in stable condition.    Dictation software was used in the creation of this note which does not evaluate or correct for typographical, spelling, syntax or grammatical errors.    Orders and Diagnoses  There are no diagnoses linked to this  encounter.    Medical Admin Record      Follow Up Instructions  No follow-ups on file.    Patient disposition: Home    Electronically signed by MITZY Lau  4:40 PM       [1] (Not in a hospital admission)  [2]   Past Medical History:  Diagnosis Date    Encounter for surveillance of implantable subdermal contraceptive 05/14/2021    Encounter for removal and reinsertion of Nexplanon    Other conditions influencing health status 05/18/2018    Cyst of neck    Other conditions influencing health status     Nulliparity    Pain in unspecified shoulder 12/18/2017    Pain in shoulder    Personal history of other specified conditions 03/13/2018    History of abnormal weight loss   [3]   Past Surgical History:  Procedure Laterality Date    SHOULDER SURGERY  04/04/2017    Shoulder Surgery